# Patient Record
Sex: FEMALE | Race: WHITE | NOT HISPANIC OR LATINO | Employment: FULL TIME | URBAN - METROPOLITAN AREA
[De-identification: names, ages, dates, MRNs, and addresses within clinical notes are randomized per-mention and may not be internally consistent; named-entity substitution may affect disease eponyms.]

---

## 2019-12-06 ENCOUNTER — INITIAL PRENATAL (OUTPATIENT)
Dept: OBGYN CLINIC | Facility: CLINIC | Age: 24
End: 2019-12-06

## 2019-12-06 ENCOUNTER — ROUTINE PRENATAL (OUTPATIENT)
Dept: OBGYN CLINIC | Facility: CLINIC | Age: 24
End: 2019-12-06

## 2019-12-06 VITALS
SYSTOLIC BLOOD PRESSURE: 118 MMHG | BODY MASS INDEX: 29.31 KG/M2 | WEIGHT: 193.4 LBS | HEIGHT: 68 IN | DIASTOLIC BLOOD PRESSURE: 76 MMHG

## 2019-12-06 VITALS — WEIGHT: 193.4 LBS | BODY MASS INDEX: 29.84 KG/M2 | DIASTOLIC BLOOD PRESSURE: 76 MMHG | SYSTOLIC BLOOD PRESSURE: 118 MMHG

## 2019-12-06 DIAGNOSIS — Z34.83 PRENATAL CARE, SUBSEQUENT PREGNANCY, THIRD TRIMESTER: Primary | ICD-10-CM

## 2019-12-06 DIAGNOSIS — Z3A.31 31 WEEKS GESTATION OF PREGNANCY: ICD-10-CM

## 2019-12-06 DIAGNOSIS — Z34.03 ENCOUNTER FOR SUPERVISION OF NORMAL FIRST PREGNANCY IN THIRD TRIMESTER: Primary | ICD-10-CM

## 2019-12-06 PROCEDURE — PNV: Performed by: OBSTETRICS & GYNECOLOGY

## 2019-12-06 PROCEDURE — NOBC: Performed by: OBSTETRICS & GYNECOLOGY

## 2019-12-06 NOTE — PATIENT INSTRUCTIONS
Serous verify from [facility], size equals dates, appropriate workup, will make arrangements for consultation Maternal-Fetal Medicine for history of of marginal insertion of the cord  Return to office in 2 weeks  She has a kick count sheet

## 2019-12-06 NOTE — PROGRESS NOTES
Intrauterine pregnancy, size equals dates, transfer some [place], now 31+ weeks, history of marginal cord insertion will make consultation to Maternal-Fetal Medicine in 3 weeks  Rh-negative she has received RhoGAM twice watch beginning of her pregnancy due for spotting and then at 28 weeks  She has a fetal kick count sheet  The baby is now vertex

## 2019-12-06 NOTE — PROGRESS NOTES
INITIAL PRENATAL NURSE APPT:      Transfer @ 31 5/7 wk from [place], [place] - 31 5/7 wk  Has records but will have labs faxed here  Was followed by OB until 19  Pt is Rh NEG - had Rhogam @ 14 wk (subchor hemorrhage) & 28 wk  28 wk labs done - glucose wnl  Baby was breech until 19  marginal cord insertion - had level 2 U/S &  monthly U/S for growth thereafter - last U/S 19  Taking prenatal vits, DHA supp  Hx celiac disease  Had pap - ASCUS, (+) HPV beg preg - colpo during pregnancy (no biopsies) - recom repeat colpo after del  Pt did not get flu vaccine  Pt did not get TDAP yet during pregnancy - recom  Prev had CF testing - pt &  (-) carrier

## 2019-12-20 ENCOUNTER — ROUTINE PRENATAL (OUTPATIENT)
Dept: OBGYN CLINIC | Facility: CLINIC | Age: 24
End: 2019-12-20

## 2019-12-20 VITALS — WEIGHT: 199.4 LBS | SYSTOLIC BLOOD PRESSURE: 106 MMHG | BODY MASS INDEX: 30.77 KG/M2 | DIASTOLIC BLOOD PRESSURE: 70 MMHG

## 2019-12-20 DIAGNOSIS — Z34.83 PRENATAL CARE, SUBSEQUENT PREGNANCY, THIRD TRIMESTER: Primary | ICD-10-CM

## 2019-12-20 PROCEDURE — PNV: Performed by: OBSTETRICS & GYNECOLOGY

## 2019-12-24 ENCOUNTER — ROUTINE PRENATAL (OUTPATIENT)
Dept: PERINATAL CARE | Facility: CLINIC | Age: 24
End: 2019-12-24
Payer: COMMERCIAL

## 2019-12-24 VITALS
HEART RATE: 77 BPM | DIASTOLIC BLOOD PRESSURE: 78 MMHG | SYSTOLIC BLOOD PRESSURE: 121 MMHG | WEIGHT: 198.4 LBS | BODY MASS INDEX: 30.07 KG/M2 | HEIGHT: 68 IN

## 2019-12-24 DIAGNOSIS — Z3A.34 34 WEEKS GESTATION OF PREGNANCY: ICD-10-CM

## 2019-12-24 DIAGNOSIS — Z36.3 ENCOUNTER FOR ANTENATAL SCREENING FOR MALFORMATIONS: Primary | ICD-10-CM

## 2019-12-24 PROCEDURE — 99201 PR OFFICE OUTPATIENT NEW 10 MINUTES: CPT | Performed by: OBSTETRICS & GYNECOLOGY

## 2019-12-24 PROCEDURE — 76805 OB US >/= 14 WKS SNGL FETUS: CPT | Performed by: OBSTETRICS & GYNECOLOGY

## 2019-12-24 NOTE — LETTER
December 24, 2019     Chris Nance MD  [address]  [address] [place] [address]    Patient: Adria Turner   YOB: 1995   Date of Visit: 12/24/2019       Dear Dr Zohra Farris: Thank you for referring Adria Turner to me for evaluation  Below are my notes for this consultation  If you have questions, please do not hesitate to call me  I look forward to following your patient along with you  Sincerely,        Elan Jarvis MD        CC: No Recipients  Elan Jarvis MD  12/24/2019  1:37 PM  Sign at close encounter  Please refer to the [facility] ultrasound report in Ob Procedures for additional information regarding the visit to the [facility], [facility]  today

## 2019-12-24 NOTE — PROGRESS NOTES
Please refer to the [facility] ultrasound report in Ob Procedures for additional information regarding the visit to the [facility], [facility]  today

## 2019-12-30 ENCOUNTER — TELEPHONE (OUTPATIENT)
Dept: OBGYN CLINIC | Facility: CLINIC | Age: 24
End: 2019-12-30

## 2019-12-30 NOTE — TELEPHONE ENCOUNTER
OB - 35 1/7 wk - c/o (R) sided back pain past 5-6 days, more constant, has taken Tylenol x 2 doses since back pain started  (+) FM  Pt to take ES Tylenol q 6-8 hrs x 24 hrs, intermittent heat with heating pad  Recall office 12/20/19 to update  Next OB appt 1/6/20

## 2019-12-31 ENCOUNTER — HOSPITAL ENCOUNTER (OUTPATIENT)
Facility: HOSPITAL | Age: 24
Discharge: HOME | End: 2019-12-31
Attending: OBSTETRICS & GYNECOLOGY | Admitting: OBSTETRICS & GYNECOLOGY
Payer: COMMERCIAL

## 2019-12-31 ENCOUNTER — HOSPITAL ENCOUNTER (OUTPATIENT)
Facility: HOSPITAL | Age: 24
End: 2019-12-31
Attending: OBSTETRICS & GYNECOLOGY | Admitting: OBSTETRICS & GYNECOLOGY
Payer: COMMERCIAL

## 2019-12-31 ENCOUNTER — APPOINTMENT (OUTPATIENT)
Dept: RADIOLOGY | Facility: HOSPITAL | Age: 24
End: 2019-12-31
Payer: COMMERCIAL

## 2019-12-31 VITALS
TEMPERATURE: 98.6 F | RESPIRATION RATE: 20 BRPM | HEART RATE: 85 BPM | DIASTOLIC BLOOD PRESSURE: 75 MMHG | SYSTOLIC BLOOD PRESSURE: 120 MMHG

## 2019-12-31 DIAGNOSIS — O26.833 PREGNANCY COMPLICATED BY NEPHROLITHIASIS IN THIRD TRIMESTER, ANTEPARTUM: Primary | ICD-10-CM

## 2019-12-31 DIAGNOSIS — N20.0 PREGNANCY COMPLICATED BY NEPHROLITHIASIS IN THIRD TRIMESTER, ANTEPARTUM: Primary | ICD-10-CM

## 2019-12-31 PROBLEM — M54.9 BACK PAIN AFFECTING PREGNANCY IN THIRD TRIMESTER: Status: ACTIVE | Noted: 2019-12-31

## 2019-12-31 PROBLEM — O99.891 BACK PAIN AFFECTING PREGNANCY IN THIRD TRIMESTER: Status: ACTIVE | Noted: 2019-12-31

## 2019-12-31 LAB
BACTERIA UR QL AUTO: ABNORMAL /HPF
BILIRUB UR QL STRIP: NEGATIVE
CLARITY UR: ABNORMAL
COLOR UR: YELLOW
GLUCOSE UR STRIP-MCNC: NEGATIVE MG/DL
HGB UR QL STRIP.AUTO: ABNORMAL
HYALINE CASTS #/AREA URNS LPF: ABNORMAL /LPF
KETONES UR STRIP-MCNC: NEGATIVE MG/DL
LEUKOCYTE ESTERASE UR QL STRIP: ABNORMAL
NITRITE UR QL STRIP: NEGATIVE
NON-SQ EPI CELLS URNS QL MICRO: ABNORMAL /HPF
PH UR STRIP.AUTO: 7 [PH]
PROT UR STRIP-MCNC: ABNORMAL MG/DL
RBC #/AREA URNS AUTO: ABNORMAL /HPF
SP GR UR STRIP.AUTO: 1.01 (ref 1–1.03)
UROBILINOGEN UR QL STRIP.AUTO: 0.2 E.U./DL
WBC #/AREA URNS AUTO: ABNORMAL /HPF

## 2019-12-31 PROCEDURE — NC001 PR NO CHARGE: Performed by: STUDENT IN AN ORGANIZED HEALTH CARE EDUCATION/TRAINING PROGRAM

## 2019-12-31 PROCEDURE — 76770 US EXAM ABDO BACK WALL COMP: CPT

## 2019-12-31 PROCEDURE — 81001 URINALYSIS AUTO W/SCOPE: CPT | Performed by: STUDENT IN AN ORGANIZED HEALTH CARE EDUCATION/TRAINING PROGRAM

## 2019-12-31 PROCEDURE — 99215 OFFICE O/P EST HI 40 MIN: CPT

## 2019-12-31 PROCEDURE — 87086 URINE CULTURE/COLONY COUNT: CPT | Performed by: STUDENT IN AN ORGANIZED HEALTH CARE EDUCATION/TRAINING PROGRAM

## 2019-12-31 RX ORDER — TAMSULOSIN HYDROCHLORIDE 0.4 MG/1
0.4 CAPSULE ORAL
Qty: 14 CAPSULE | Refills: 0 | Status: SHIPPED | OUTPATIENT
Start: 2019-12-31 | End: 2020-01-25 | Stop reason: HOSPADM

## 2019-12-31 RX ORDER — ACETAMINOPHEN 325 MG/1
650 TABLET ORAL EVERY 6 HOURS PRN
Status: DISCONTINUED | OUTPATIENT
Start: 2019-12-31 | End: 2019-12-31 | Stop reason: HOSPADM

## 2019-12-31 RX ADMIN — ACETAMINOPHEN 650 MG: 325 TABLET ORAL at 16:09

## 2019-12-31 NOTE — TELEPHONE ENCOUNTER
Pt still having (R) sided back pain unrelieved wit taking Tylenol consistently  Was having some mild pelvic cramping yesterday, none today  (+) FM today  No urinary sx  Thinks she lost mucous plug 12/30/19  To triage to evaluate/JSW  Pt informed  LR notified

## 2020-01-01 LAB — BACTERIA UR CULT: ABNORMAL

## 2020-01-06 ENCOUNTER — ROUTINE PRENATAL (OUTPATIENT)
Dept: OBGYN CLINIC | Facility: CLINIC | Age: 25
End: 2020-01-06

## 2020-01-06 VITALS — DIASTOLIC BLOOD PRESSURE: 84 MMHG | WEIGHT: 200.2 LBS | SYSTOLIC BLOOD PRESSURE: 130 MMHG | BODY MASS INDEX: 30.89 KG/M2

## 2020-01-06 DIAGNOSIS — Z36.85 ANTENATAL SCREENING FOR STREPTOCOCCUS B: ICD-10-CM

## 2020-01-06 DIAGNOSIS — Z34.03 ENCOUNTER FOR SUPERVISION OF NORMAL FIRST PREGNANCY IN THIRD TRIMESTER: Primary | ICD-10-CM

## 2020-01-06 PROCEDURE — 99910: CPT | Performed by: OBSTETRICS & GYNECOLOGY

## 2020-01-06 PROCEDURE — PNV: Performed by: OBSTETRICS & GYNECOLOGY

## 2020-01-06 PROCEDURE — 87653 STREP B DNA AMP PROBE: CPT | Performed by: OBSTETRICS & GYNECOLOGY

## 2020-01-06 NOTE — PROGRESS NOTES
The patient passed a kidney stone on January 1st she is feels much better  GBS culture done today    Return to office in 1 week verbalization

## 2020-01-06 NOTE — PATIENT INSTRUCTIONS
Positive fetal movement past kidney stone January 1st doing much better GBS culture done today  Return to office in 1 week

## 2020-01-08 LAB — GP B STREP DNA SPEC QL NAA+PROBE: NORMAL

## 2020-01-17 ENCOUNTER — ROUTINE PRENATAL (OUTPATIENT)
Dept: OBGYN CLINIC | Facility: CLINIC | Age: 25
End: 2020-01-17

## 2020-01-17 VITALS — WEIGHT: 203.6 LBS | BODY MASS INDEX: 31.42 KG/M2 | DIASTOLIC BLOOD PRESSURE: 84 MMHG | SYSTOLIC BLOOD PRESSURE: 118 MMHG

## 2020-01-17 DIAGNOSIS — Z34.83 PRENATAL CARE, SUBSEQUENT PREGNANCY, THIRD TRIMESTER: Primary | ICD-10-CM

## 2020-01-17 PROCEDURE — PNV: Performed by: OBSTETRICS & GYNECOLOGY

## 2020-01-17 NOTE — PATIENT INSTRUCTIONS
Positive fetal movement no signs symptoms of labor return to office in 1 week  To continue to fetal kick counts

## 2020-01-22 ENCOUNTER — TELEPHONE (OUTPATIENT)
Dept: OBGYN CLINIC | Facility: CLINIC | Age: 25
End: 2020-01-22

## 2020-01-22 NOTE — TELEPHONE ENCOUNTER
----- Message from Edis Wang MD sent at 1/14/2020 12:34 PM EST -----  Please call this patient CC symptomatic from a positive UA back in December

## 2020-01-23 ENCOUNTER — TELEPHONE (OUTPATIENT)
Dept: OBGYN CLINIC | Facility: CLINIC | Age: 25
End: 2020-01-23

## 2020-01-23 ENCOUNTER — ANESTHESIA EVENT (INPATIENT)
Dept: ANESTHESIOLOGY | Facility: HOSPITAL | Age: 25
End: 2020-01-23
Payer: COMMERCIAL

## 2020-01-23 ENCOUNTER — ANESTHESIA (INPATIENT)
Dept: ANESTHESIOLOGY | Facility: HOSPITAL | Age: 25
End: 2020-01-23
Payer: COMMERCIAL

## 2020-01-23 ENCOUNTER — HOSPITAL ENCOUNTER (INPATIENT)
Facility: HOSPITAL | Age: 25
LOS: 2 days | Discharge: HOME | End: 2020-01-25
Attending: OBSTETRICS & GYNECOLOGY | Admitting: OBSTETRICS & GYNECOLOGY
Payer: COMMERCIAL

## 2020-01-23 DIAGNOSIS — Z3A.38 38 WEEKS GESTATION OF PREGNANCY: ICD-10-CM

## 2020-01-23 LAB
ABO GROUP BLD: NORMAL
BASE EXCESS BLDCOA CALC-SCNC: -3.4 MMOL/L (ref 3–11)
BASE EXCESS BLDCOV CALC-SCNC: -3.5 MMOL/L (ref 1–9)
BLD GP AB SCN SERPL QL: POSITIVE
BLOOD GROUP ANTIBODIES SERPL: NORMAL
ERYTHROCYTE [DISTWIDTH] IN BLOOD BY AUTOMATED COUNT: 11.9 % (ref 11.6–15.1)
HCO3 BLDCOA-SCNC: 24.9 MMOL/L (ref 17.3–27.3)
HCO3 BLDCOV-SCNC: 22.3 MMOL/L (ref 12.2–28.6)
HCT VFR BLD AUTO: 37.8 % (ref 34.8–46.1)
HGB BLD-MCNC: 13.2 G/DL (ref 11.5–15.4)
MCH RBC QN AUTO: 32.7 PG (ref 26.8–34.3)
MCHC RBC AUTO-ENTMCNC: 34.9 G/DL (ref 31.4–37.4)
MCV RBC AUTO: 94 FL (ref 82–98)
O2 CT VFR BLDCOA CALC: 7.2 ML/DL
OXYHGB MFR BLDCOA: 32.3 %
OXYHGB MFR BLDCOV: 62 %
PCO2 BLDCOA: 57.6 MM[HG] (ref 30–60)
PCO2 BLDCOV: 42.9 MM HG (ref 27–43)
PH BLDCOA: 7.25 [PH] (ref 7.23–7.43)
PH BLDCOV: 7.33 [PH] (ref 7.19–7.49)
PLATELET # BLD AUTO: 376 THOUSANDS/UL (ref 149–390)
PMV BLD AUTO: 10.3 FL (ref 8.9–12.7)
PO2 BLDCOA: 17.1 MM HG (ref 5–25)
PO2 BLDCOV: 25.1 MM HG (ref 15–45)
RBC # BLD AUTO: 4.04 MILLION/UL (ref 3.81–5.12)
RH BLD: NEGATIVE
SAO2 % BLDCOV: 14.1 ML/DL
SPECIMEN EXPIRATION DATE: NORMAL
WBC # BLD AUTO: 17.04 THOUSAND/UL (ref 4.31–10.16)

## 2020-01-23 PROCEDURE — 0KQM0ZZ REPAIR PERINEUM MUSCLE, OPEN APPROACH: ICD-10-PCS | Performed by: OBSTETRICS & GYNECOLOGY

## 2020-01-23 PROCEDURE — 86592 SYPHILIS TEST NON-TREP QUAL: CPT | Performed by: STUDENT IN AN ORGANIZED HEALTH CARE EDUCATION/TRAINING PROGRAM

## 2020-01-23 PROCEDURE — 82805 BLOOD GASES W/O2 SATURATION: CPT | Performed by: OBSTETRICS & GYNECOLOGY

## 2020-01-23 PROCEDURE — 10907ZC DRAINAGE OF AMNIOTIC FLUID, THERAPEUTIC FROM PRODUCTS OF CONCEPTION, VIA NATURAL OR ARTIFICIAL OPENING: ICD-10-PCS | Performed by: OBSTETRICS & GYNECOLOGY

## 2020-01-23 PROCEDURE — 86901 BLOOD TYPING SEROLOGIC RH(D): CPT | Performed by: STUDENT IN AN ORGANIZED HEALTH CARE EDUCATION/TRAINING PROGRAM

## 2020-01-23 PROCEDURE — 86870 RBC ANTIBODY IDENTIFICATION: CPT | Performed by: STUDENT IN AN ORGANIZED HEALTH CARE EDUCATION/TRAINING PROGRAM

## 2020-01-23 PROCEDURE — 4A1HXCZ MONITORING OF PRODUCTS OF CONCEPTION, CARDIAC RATE, EXTERNAL APPROACH: ICD-10-PCS | Performed by: OBSTETRICS & GYNECOLOGY

## 2020-01-23 PROCEDURE — 99214 OFFICE O/P EST MOD 30 MIN: CPT

## 2020-01-23 PROCEDURE — 59400 OBSTETRICAL CARE: CPT | Performed by: OBSTETRICS & GYNECOLOGY

## 2020-01-23 PROCEDURE — 99024 POSTOP FOLLOW-UP VISIT: CPT | Performed by: OBSTETRICS & GYNECOLOGY

## 2020-01-23 PROCEDURE — 85027 COMPLETE CBC AUTOMATED: CPT | Performed by: STUDENT IN AN ORGANIZED HEALTH CARE EDUCATION/TRAINING PROGRAM

## 2020-01-23 PROCEDURE — 86850 RBC ANTIBODY SCREEN: CPT | Performed by: STUDENT IN AN ORGANIZED HEALTH CARE EDUCATION/TRAINING PROGRAM

## 2020-01-23 PROCEDURE — 86900 BLOOD TYPING SEROLOGIC ABO: CPT | Performed by: STUDENT IN AN ORGANIZED HEALTH CARE EDUCATION/TRAINING PROGRAM

## 2020-01-23 RX ORDER — OXYTOCIN/RINGER'S LACTATE 30/500 ML
PLASTIC BAG, INJECTION (ML) INTRAVENOUS
Status: DISPENSED
Start: 2020-01-23 | End: 2020-01-24

## 2020-01-23 RX ORDER — LIDOCAINE HYDROCHLORIDE AND EPINEPHRINE 15; 5 MG/ML; UG/ML
INJECTION, SOLUTION EPIDURAL
Status: COMPLETED | OUTPATIENT
Start: 2020-01-23 | End: 2020-01-23

## 2020-01-23 RX ORDER — BUTORPHANOL TARTRATE 1 MG/ML
1 INJECTION, SOLUTION INTRAMUSCULAR; INTRAVENOUS ONCE
Status: COMPLETED | OUTPATIENT
Start: 2020-01-23 | End: 2020-01-23

## 2020-01-23 RX ORDER — SODIUM CHLORIDE, SODIUM LACTATE, POTASSIUM CHLORIDE, CALCIUM CHLORIDE 600; 310; 30; 20 MG/100ML; MG/100ML; MG/100ML; MG/100ML
125 INJECTION, SOLUTION INTRAVENOUS CONTINUOUS
Status: DISCONTINUED | OUTPATIENT
Start: 2020-01-23 | End: 2020-01-23

## 2020-01-23 RX ORDER — ROPIVACAINE HYDROCHLORIDE 2 MG/ML
INJECTION, SOLUTION EPIDURAL; INFILTRATION; PERINEURAL CONTINUOUS PRN
Status: DISCONTINUED | OUTPATIENT
Start: 2020-01-23 | End: 2020-01-23 | Stop reason: SURG

## 2020-01-23 RX ORDER — IBUPROFEN 600 MG/1
600 TABLET ORAL EVERY 6 HOURS PRN
Status: DISCONTINUED | OUTPATIENT
Start: 2020-01-23 | End: 2020-01-25 | Stop reason: HOSPADM

## 2020-01-23 RX ORDER — DIAPER,BRIEF,INFANT-TODD,DISP
1 EACH MISCELLANEOUS AS NEEDED
Status: DISCONTINUED | OUTPATIENT
Start: 2020-01-23 | End: 2020-01-25 | Stop reason: HOSPADM

## 2020-01-23 RX ORDER — ONDANSETRON 2 MG/ML
4 INJECTION INTRAMUSCULAR; INTRAVENOUS EVERY 6 HOURS PRN
Status: DISCONTINUED | OUTPATIENT
Start: 2020-01-23 | End: 2020-01-23

## 2020-01-23 RX ORDER — ROPIVACAINE HYDROCHLORIDE 2 MG/ML
INJECTION, SOLUTION EPIDURAL; INFILTRATION; PERINEURAL AS NEEDED
Status: DISCONTINUED | OUTPATIENT
Start: 2020-01-23 | End: 2020-01-23 | Stop reason: SURG

## 2020-01-23 RX ORDER — SIMETHICONE 80 MG
80 TABLET,CHEWABLE ORAL 4 TIMES DAILY PRN
Status: DISCONTINUED | OUTPATIENT
Start: 2020-01-23 | End: 2020-01-25 | Stop reason: HOSPADM

## 2020-01-23 RX ORDER — CALCIUM CARBONATE 200(500)MG
1000 TABLET,CHEWABLE ORAL DAILY PRN
Status: DISCONTINUED | OUTPATIENT
Start: 2020-01-23 | End: 2020-01-25 | Stop reason: HOSPADM

## 2020-01-23 RX ORDER — DOCUSATE SODIUM 100 MG/1
100 CAPSULE, LIQUID FILLED ORAL 2 TIMES DAILY
Status: DISCONTINUED | OUTPATIENT
Start: 2020-01-24 | End: 2020-01-25 | Stop reason: HOSPADM

## 2020-01-23 RX ORDER — ACETAMINOPHEN 325 MG/1
650 TABLET ORAL EVERY 4 HOURS PRN
Status: DISCONTINUED | OUTPATIENT
Start: 2020-01-23 | End: 2020-01-25 | Stop reason: HOSPADM

## 2020-01-23 RX ADMIN — ROPIVACAINE HYDROCHLORIDE: 2 INJECTION, SOLUTION EPIDURAL; INFILTRATION at 17:48

## 2020-01-23 RX ADMIN — BUTORPHANOL TARTRATE 1 MG: 1 INJECTION INTRAMUSCULAR; INTRAVENOUS at 13:33

## 2020-01-23 RX ADMIN — SODIUM CHLORIDE, SODIUM LACTATE, POTASSIUM CHLORIDE, AND CALCIUM CHLORIDE 125 ML/HR: .6; .31; .03; .02 INJECTION, SOLUTION INTRAVENOUS at 19:01

## 2020-01-23 RX ADMIN — SODIUM CHLORIDE, SODIUM LACTATE, POTASSIUM CHLORIDE, AND CALCIUM CHLORIDE 999 ML/HR: .6; .31; .03; .02 INJECTION, SOLUTION INTRAVENOUS at 16:10

## 2020-01-23 RX ADMIN — ROPIVACAINE HYDROCHLORIDE 5 ML: 2 INJECTION, SOLUTION EPIDURAL; INFILTRATION at 17:32

## 2020-01-23 RX ADMIN — LIDOCAINE HYDROCHLORIDE AND EPINEPHRINE 3 ML: 15; 5 INJECTION, SOLUTION EPIDURAL at 17:25

## 2020-01-23 RX ADMIN — ONDANSETRON 4 MG: 2 INJECTION INTRAMUSCULAR; INTRAVENOUS at 16:50

## 2020-01-23 RX ADMIN — SODIUM CHLORIDE, SODIUM LACTATE, POTASSIUM CHLORIDE, AND CALCIUM CHLORIDE 125 ML/HR: .6; .31; .03; .02 INJECTION, SOLUTION INTRAVENOUS at 17:45

## 2020-01-23 RX ADMIN — Medication 10 ML/HR: at 17:39

## 2020-01-23 NOTE — ANESTHESIA PROCEDURE NOTES
Epidural Block    Patient location during procedure: OB  Start time: 1/23/2020 5:23 PM  Reason for block: procedure for pain and at surgeon's request  Staffing  Anesthesiologist: Joey Ashraf MD  Performed: anesthesiologist   Preanesthetic Checklist  Completed: patient identified, site marked, surgical consent, pre-op evaluation, timeout performed, IV checked, risks and benefits discussed and monitors and equipment checked  Epidural  Patient position: sitting  Prep: ChloraPrep  Patient monitoring: cardiac monitor and frequent blood pressure checks  Approach: midline  Location: lumbar (1-5)  Injection technique: JC air  Needle  Needle type: Tuohy   Needle gauge: 18 G  Catheter type: side hole  Catheter size: 20 G  Catheter at skin depth: 11 cm  Test dose: negativelidocaine 1 5% with epinephrine 1:200,000 test dose, 3 mL  Assessment  Sensory level: T10  Events: paresthesianegative aspiration for CSF, negative aspiration for heme and no paresthesia on injection  patient tolerated the procedure well with no immediate complications  Additional Notes  Patient with paresthesia on catheter placement that immediately resolved   Otherwise unremarkable

## 2020-01-23 NOTE — TELEPHONE ENCOUNTER
Patient call she is kaylynn every 5 min lasting 60 sec for the last hour  Patient sent to L&D triage  JSW and Charge SORAYA hooker

## 2020-01-23 NOTE — ANESTHESIA PREPROCEDURE EVALUATION
Review of Systems/Medical History  Patient summary reviewed  Chart reviewed      Cardiovascular  Negative cardio ROS Exercise tolerance (METS): >4,     Pulmonary  Negative pulmonary ROS        GI/Hepatic  Negative GI/hepatic ROS          Negative  ROS        Endo/Other  Negative endo/other ROS      GYN  Negative gynecology ROS          Hematology  Negative hematology ROS      Musculoskeletal  Negative musculoskeletal ROS        Neurology  Negative neurology ROS      Psychology   Negative psychology ROS              Physical Exam    Airway    Mallampati score: II  TM Distance: >3 FB  Neck ROM: full     Dental   No notable dental hx     Cardiovascular  Comment: Negative ROS, Rhythm: regular, Rate: normal, Cardiovascular exam normal    Pulmonary  Pulmonary exam normal Breath sounds clear to auscultation,     Other Findings        Anesthesia Plan  ASA Score- 2     Anesthesia Type- epidural with ASA Monitors  Additional Monitors:   Airway Plan:     Comment: Native airway maintained with NC  Plan Factors-    Induction-     Postoperative Plan-     Informed Consent- Anesthetic plan and risks discussed with patient  I personally reviewed this patient with the CRNA  Discussed and agreed on the Anesthesia Plan with the CRNA  Evie Quispe

## 2020-01-23 NOTE — PLAN OF CARE
Problem: BIRTH - VAGINAL/ SECTION  Goal: Fetal and maternal status remain reassuring during the birth process  Description  INTERVENTIONS:  - Monitor vital signs  - Monitor fetal heart rate  - Monitor uterine activity  - Monitor labor progression (vaginal delivery)  - DVT prophylaxis  - Antibiotic prophylaxis  Outcome: Progressing  Goal: Emotionally satisfying birthing experience for mother/fetus  Description  Interventions:  - Assess, plan, implement and evaluate the nursing care given to the patient in labor  - Advocate the philosophy that each childbirth experience is a unique experience and support the family's chosen level of involvement and control during the labor process   - Actively participate in both the patient's and family's teaching of the birth process  - Consider cultural, Mandaeism and age-specific factors and plan care for the patient in labor  Outcome: Progressing     Problem: Knowledge Deficit  Goal: Verbalizes understanding of labor plan  Description  Assess patient/family/caregiver's baseline knowledge level and ability to understand information  Provide education via patient/family/caregiver's preferred learning method at appropriate level of understanding  1  Provide teaching at level of understanding  2  Provide teaching via preferred learning method(s)  Outcome: Progressing     Problem: Labor & Delivery  Goal: Manages discomfort  Description  Assess and monitor for signs and symptoms of discomfort  Assess patient's pain level regularly and per hospital policy  Administer medications as ordered  Support use of nonpharmacological methods to help control pain such as distraction, imagery, relaxation, and application of heat and cold  Collaborate with interdisciplinary team and patient to determine appropriate pain management plan  1  Include patient in decisions related to comfort  2  Offer non-pharmacological pain management interventions    3  Report ineffective pain management to physician  Outcome: Progressing  Goal: Patient vital signs are stable  Description  1  Assess vital signs - vaginal delivery    Outcome: Progressing

## 2020-01-23 NOTE — H&P
[address] 21 y o  female MRN: [de-identified]  Unit/Bed#: -01 Encounter: [id number]    Assessment: 21 y o  [employer] [employer] at 38w4d admitted for labor  SVE: 3/80/-2  FHT: 125bpm  Clinical EFW: 7lbs ; Vertex confirmed by U/s  GBS status: Negative       Plan:   · Admit  · CBC, RPR, Type & Screen  · Analgesia at maternal request  · Expectant management to start  · IV fluids  · Clear liquids    Dr Elly Matthew aware      Chief Complaint: contractions    HPI: Inder Puga is a 21 y o  [employer] with an NEFTALI of 2020, by Last Menstrual Period at 38w4d who is being admitted for labor   She complains of uterine contractions, occurring every 3-5 minutes, has no LOF, and reports no VB  She states she has felt good FM  Amy Shea Patient Active Problem List   Diagnosis    Back pain affecting pregnancy in third trimester    Pregnancy complicated by nephrolithiasis in third trimester, antepartum    38 weeks gestation of pregnancy       Baby complications/comments: none    Review of Systems   Constitutional: Negative for chills and fever  Respiratory: Negative for cough, shortness of breath and wheezing  Cardiovascular: Negative for chest pain  Gastrointestinal: Negative for abdominal pain, nausea and vomiting  Genitourinary: Negative for dysuria, hematuria, urgency, vaginal bleeding and vaginal discharge  Neurological: Negative for dizziness, weakness, light-headedness and headaches         OB History    Para Term  AB Living   1 0 0 0 0 0   SAB TAB Ectopic Multiple Live Births   0 0 0 0 0      # Outcome Date GA Lbr Kwame/2nd Weight Sex Delivery Anes PTL Lv   1 Current                Past Medical History:   Diagnosis Date    Abnormal Pap smear of cervix     ASCUS, (+) HPV - HAD COLPO DURING PREG - NO BIOPSIES - F/U AFTER DEL    HPV (human papilloma virus) infection     Migraine     beg of pregnancy related to celiac    Rh incompatibility     had Rhogam @ 14 wk (bleeding) & 28 wk       Past Surgical History:   Procedure Laterality Date    AUGMENTATION BREAST      2016       Social History     Tobacco Use    Smoking status: Never Smoker    Smokeless tobacco: Never Used   Substance Use Topics    Alcohol use: Yes     Alcohol/week: 4 0 standard drinks     Types: 4 Glasses of wine per week     Comment: NONE SINCE PREG       No Known Allergies    Medications Prior to Admission   Medication    Docosahexaenoic Acid (DHA PO)    Prenatal Vit-Fe Fumarate-FA (PRENATAL VITAMIN PO)    tamsulosin (FLOMAX) 0 4 mg       Objective:  Temp:  [97 8 °F (36 6 °C)-98 9 °F (37 2 °C)] 97 8 °F (36 6 °C)  HR:  [67-85] 67  Resp:  [18] 18  BP: (122-135)/(73-84) 135/84  Body mass index is 31 33 kg/m²  Physical Exam:  Physical Exam   Constitutional: She is oriented to person, place, and time  She appears well-developed and well-nourished  Cardiovascular: Normal rate, regular rhythm and normal heart sounds  Exam reveals no gallop and no friction rub  No murmur heard  Pulmonary/Chest: Effort normal  No respiratory distress  She has no wheezes  Abdominal: Soft  There is no tenderness  Musculoskeletal: She exhibits no tenderness  Neurological: She is alert and oriented to person, place, and time  Vitals reviewed         FHT:  Baseline Rate: 125 bpm  Variability: Moderate 6-25 bpm  Accelerations: 15 x 15 or greater  Decelerations: None    TOCO:   Contraction Frequency (minutes): difficult trace  Contraction Duration (seconds): 50-60  Contraction Quality: Mild    Lab Results   Component Value Date    WBC 17 04 (H) 01/23/2020    HGB 13 2 01/23/2020    HCT 37 8 01/23/2020     01/23/2020     No results found for: NA, K, CL, CO2, BUN, CREATININE, GLUCOSE, AST, ALT  Prenatal Labs: Reviewed      Blood type: Negative  Antibody: pending T&Screen  GBS: Negative  HIV:Negative  Rubella: Immune  VDRL/RPR: Non reactive  HBsAg: Negative  Chlamydia: Negative  Gonorrhea: Negative  Diabetes 1 hour screen: normal  Platelets: f/u CBC  Hgb: f/u CBC  >2 Midnights  INPATIENT     Signature/Title:  Kayla Mccann MD  Date: 1/23/2020  Time: 4:49 PM

## 2020-01-24 LAB — RPR SER QL: NORMAL

## 2020-01-24 PROCEDURE — 99024 POSTOP FOLLOW-UP VISIT: CPT | Performed by: OBSTETRICS & GYNECOLOGY

## 2020-01-24 RX ADMIN — IBUPROFEN 600 MG: 600 TABLET ORAL at 23:03

## 2020-01-24 RX ADMIN — IBUPROFEN 600 MG: 600 TABLET ORAL at 17:05

## 2020-01-24 RX ADMIN — ACETAMINOPHEN 650 MG: 325 TABLET, FILM COATED ORAL at 14:35

## 2020-01-24 RX ADMIN — ACETAMINOPHEN 650 MG: 325 TABLET, FILM COATED ORAL at 05:44

## 2020-01-24 RX ADMIN — IBUPROFEN 600 MG: 600 TABLET ORAL at 11:08

## 2020-01-24 RX ADMIN — DOCUSATE SODIUM 100 MG: 100 CAPSULE, LIQUID FILLED ORAL at 08:25

## 2020-01-24 RX ADMIN — IBUPROFEN 600 MG: 600 TABLET ORAL at 04:56

## 2020-01-24 RX ADMIN — Medication 1 TABLET: at 08:25

## 2020-01-24 RX ADMIN — DOCUSATE SODIUM 100 MG: 100 CAPSULE, LIQUID FILLED ORAL at 17:04

## 2020-01-24 RX ADMIN — ACETAMINOPHEN 650 MG: 325 TABLET, FILM COATED ORAL at 21:06

## 2020-01-24 RX ADMIN — HUMAN RHO(D) IMMUNE GLOBULIN 300 MCG: 300 INJECTION, SOLUTION INTRAMUSCULAR at 16:54

## 2020-01-24 RX ADMIN — BENZOCAINE AND LEVOMENTHOL 1 APPLICATION: 200; 5 SPRAY TOPICAL at 01:00

## 2020-01-24 NOTE — OB LABOR/OXYTOCIN SAFETY PROGRESS
Labor Progress Note - Adria Turner 21 y o  female MRN: [de-identified]    Unit/Bed#: -01 Encounter: [id number]       Contraction Frequency (minutes): difficult trace  Contraction Quality: Mild  Tachysystole: No   Dilation: 5        Effacement (%): 90  Station: -1  Baseline Rate: 125 bpm  Fetal Heart Rate: 118 BPM                Notes/comments:   Pt feeling comfortable with epidural    SVE now 5/90/-1  Will AROM at next check       Tiny Romberg, MD 1/23/2020 7:05 PM

## 2020-01-24 NOTE — SOCIAL WORK
Breast pump consult  Discussed freedom of choice and options with pt  Per pt request, Spectra S1 plus tote breast pump package ordered from [facility] via [place] for today  Pt is aware there is an upgrade fee and has means to pay today  No other CM needs noted

## 2020-01-24 NOTE — PLAN OF CARE
Problem: Knowledge Deficit  Goal: Verbalizes understanding of labor plan  Description  Assess patient/family/caregiver's baseline knowledge level and ability to understand information  Provide education via patient/family/caregiver's preferred learning method at appropriate level of understanding  1  Provide teaching at level of understanding  2  Provide teaching via preferred learning method(s)  1/24/2020 1058 by Jose A Cade RN  Outcome: Progressing  1/24/2020 1057 by Jose A Cade RN  Outcome: Progressing     Problem: PAIN - ADULT  Goal: Verbalizes/displays adequate comfort level or baseline comfort level  Description  Interventions:  - Encourage patient to monitor pain and request assistance  - Assess pain using appropriate pain scale  - Administer analgesics based on type and severity of pain and evaluate response  - Implement non-pharmacological measures as appropriate and evaluate response  - Consider cultural and social influences on pain and pain management  - Notify physician/advanced practitioner if interventions unsuccessful or patient reports new pain  1/24/2020 1058 by Jose A Cade RN  Outcome: Progressing  1/24/2020 1057 by Jose A Cade RN  Outcome: Progressing     Problem: SAFETY ADULT  Goal: Patient will remain free of falls  Description  INTERVENTIONS:  - Assess patient frequently for physical needs  -  Identify cognitive and physical deficits and behaviors that affect risk of falls    -  [place] fall precautions as indicated by assessment   - Educate patient/family on patient safety including physical limitations  - Instruct patient to call for assistance with activity based on assessment  - Modify environment to reduce risk of injury  - Consider OT/PT consult to assist with strengthening/mobility  1/24/2020 1058 by Jose A Cade RN  Outcome: Progressing  1/24/2020 1057 by Jose A Cade RN  Outcome: Progressing  Goal: Maintain or return to baseline ADL function  Description  INTERVENTIONS:  -  Assess patient's ability to carry out ADLs; assess patient's baseline for ADL function and identify physical deficits which impact ability to perform ADLs (bathing, care of mouth/teeth, toileting, grooming, dressing, etc )  - Assess/evaluate cause of self-care deficits   - Assess range of motion  - Assess patient's mobility; develop plan if impaired  - Assess patient's need for assistive devices and provide as appropriate  - Encourage maximum independence but intervene and supervise when necessary  - Involve family in performance of ADLs  - Assess for home care needs following discharge   - Consider OT consult to assist with ADL evaluation and planning for discharge  - Provide patient education as appropriate  1/24/2020 1058 by Janet Valenzuela RN  Outcome: Progressing  1/24/2020 1057 by Janet Valenzuela RN  Outcome: Progressing  Goal: Maintain or return mobility status to optimal level  Description  INTERVENTIONS:  - Assess patient's baseline mobility status (ambulation, transfers, stairs, etc )    - Identify cognitive and physical deficits and behaviors that affect mobility  - Identify mobility aids required to assist with transfers and/or ambulation (gait belt, sit-to-stand, lift, walker, cane, etc )  - [place] fall precautions as indicated by assessment  - Record patient progress and toleration of activity level on Mobility SBAR; progress patient to next Phase/Stage  - Instruct patient to call for assistance with activity based on assessment  - Consider rehabilitation consult to assist with strengthening/weightbearing, etc   1/24/2020 1058 by Janet Valenzuela RN  Outcome: Progressing  1/24/2020 1057 by Janet Valenzuela RN  Outcome: Progressing     Problem: DISCHARGE PLANNING  Goal: Discharge to home or other facility with appropriate resources  Description  INTERVENTIONS:  - Identify barriers to discharge w/patient and caregiver  - Arrange for needed discharge resources and transportation as appropriate  - Identify discharge learning needs (meds, wound care, etc )  - Arrange for interpretive services to assist at discharge as needed  - Refer to Case Management Department for coordinating discharge planning if the patient needs post-hospital services based on physician/advanced practitioner order or complex needs related to functional status, cognitive ability, or social support system  2020 1058 by Austin Ramirez RN  Outcome: Progressing  2020 1057 by Austin Ramirez RN  Outcome: Progressing     Problem: POSTPARTUM  Goal: Experiences normal postpartum course  Description  INTERVENTIONS:  - Monitor maternal vital signs  - Assess uterine involution and lochia  2020 1058 by Austin Ramirez RN  Outcome: Progressing  2020 105 by Austin Ramirez RN  Outcome: Progressing  Goal: Appropriate maternal -  bonding  Description  INTERVENTIONS:  - Identify family support  - Assess for appropriate maternal/infant bonding   -Encourage maternal/infant bonding opportunities  - Referral to  or  as needed  2020 1058 by Austin Ramirez RN  Outcome: Progressing  2020 105 by Austin Ramirez RN  Outcome: Progressing  Goal: Establishment of infant feeding pattern  Description  INTERVENTIONS:  - Assess breast/bottle feeding  - Refer to lactation as needed  2020 1058 by Austin Ramirez RN  Outcome: Progressing  2020 105 by Austin Ramirez RN  Outcome: Progressing     Problem: ALTERATION IN THE BREAST  Goal: Optimize infant feeding at the breast  Description  INTERVENTIONS:  - Latch, breast and nipple assessment  - Assess prior breast feeding history  - Hand expression of breast milk  - For cracked, bleeding and or sore nipples reassess latch, treat damaged nipple  -Educate mother on feeding cues  -Positioning/latch techniques  2020 1058 by Austin Ramirez RN  Outcome: Progressing  2020 1057 by Christine Caruso RN  Outcome: Progressing

## 2020-01-24 NOTE — OB LABOR/OXYTOCIN SAFETY PROGRESS
Labor Progress Note - Odin Carbajal 21 y o  female MRN: [de-identified]    Unit/Bed#: -01 Encounter: [id number]       Contraction Frequency (minutes): 2 5-3 5  Contraction Quality: Moderate  Tachysystole: No   Dilation: 8        Effacement (%): 90  Station: 0  Baseline Rate: 120 bpm  Fetal Heart Rate: 118 BPM  FHR Category: Category I             Notes/comments:   Pt feeling a lot more rectal pressure  SVE 8/90/0  Will recheck in 1 hour or sooner if feeling more pressure    D/w Dr Shikha Díaz MD 1/23/2020 8:24 PM

## 2020-01-24 NOTE — OB LABOR/OXYTOCIN SAFETY PROGRESS
Labor Progress Note - Tiara Hanks 21 y o  female MRN: [de-identified]    Unit/Bed#: -01 Encounter: [id number]       Contraction Frequency (minutes): 2-3 5  Contraction Quality: Moderate  Tachysystole: No   Dilation: 9        Effacement (%): 90  Station: 1  Baseline Rate: 120 bpm  Fetal Heart Rate: 118 BPM  FHR Category: Category I             Notes/comments:   Pt feeling more rectal pressure  SVE now 9/90/+1  Will recheck within the hour  D/w Dr Davis Madrid MD 1/23/2020 8:55 PM

## 2020-01-24 NOTE — LACTATION NOTE
This note was copied from a baby's chart  CONSULT - LACTATION  Baby Boy Sylviegerry Grewal) Lola 1 days male MRN: [de-identified]    [address] AN ULTRASOUND Room / Bed: (N)/(N) Encounter: [id number]    Maternal Information     MOTHER:  [de-identified]  Maternal Age: 21 y o    OB History: #: 1, Date: 20, Sex: Male, Weight: 3515 g (7 lb 12 oz), GA: 38w4d, Delivery: Vaginal, Spontaneous, Apgar1: 9, Apgar5: 9, Living: Living, Birth Comments: None   Previous breast augmentation surgery? Yes    Lactation history:   Has patient previously breast fed: No   How long had patient previously breast fed:     Previous breast feeding complications:       Past Surgical History:   Procedure Laterality Date    AUGMENTATION BREAST      2016       Birth information:  YOB: 2021   Time of birth: 10:07 PM   Sex: male   Delivery type: Vaginal, Spontaneous   Birth Weight: 3515 g (7 lb 12 oz)   Percent of Weight Change: 0%     Gestational Age: 38w3d   [unfilled]    Assessment     Breast and nipple assessment: hx breast augmentation     Assessment: sleepy and will suck on finger, would not stay latched to breast     Feeding assessment: sucks well on assessment of oral structures  LATCH:  Latch: Repeated attempts, hold nipple in mouth, stimulate to suck   Audible Swallowing: A few with stimulation   Type of Nipple: Everted (After stimulation)   Comfort (Breast/Nipple): Soft/non-tender   Hold (Positioning): Full assist, staff holds infant at breast   LATCH Score: 6          Feeding recommendations:  breast feed on demand    Encouraged Torsten Rivera to begin pumping if infant continues to have trouble latching  Hand expression + for large amounts of colostrum  Hand expressed most of feeding into infant's mouth at the breast     Discussed 2nd night syndrome and ways to calm infant  Hand out given  Information on hand expression given   Discussed benefits of knowing how to manually express breast including stimulating milk supply, softening nipple for latch and evacuating breast in the event of engorgement  Met with mother  Provided mother with Ready, Set, Baby booklet  Discussed Skin to Skin contact an benefits to mom and baby  Talked about the delay of the first bath until baby has adjusted  Spoke about the benefits of rooming in  Feeding on cue and what that means for recognizing infant's hunger  Avoidance of pacifiers for the first month discussed  Talked about exclusive breastfeeding for the first 6 months  Positioning and latch reviewed as well as showing images of other feeding positions  Discussed the properties of a good latch in any position  Reviewed hand/manual expression  Discussed s/s that baby is getting enough milk and some s/s that breastfeeding dyad may need further help  Gave information on common concerns, what to expect the first few weeks after delivery, preparing for other caregivers, and how partners can help  Resources for support also provided  Worked on positioning infant up at chest level and starting to feed infant with nose arriving at the nipple  Then, using areolar compression to achieve a deep latch that is comfortable and exchanges optimum amounts of milk  Encouraged parents to call for assistance, questions, and concerns about breastfeeding  Extension provided    Gordon Quinn RN 1/24/2020 2:17 PM

## 2020-01-24 NOTE — DISCHARGE INSTRUCTIONS
Breast Care for the Breast Feeding Mother   WHAT YOU SHOULD KNOW:   Your breasts will go through normal changes while you are breastfeeding  Sometimes breast and nipple problems can develop while you are breastfeeding  Learn about changes that are normal and those that may be a problem  Breast care can help you prevent and manage problems so you and your baby can enjoy the benefits of breastfeeding  AFTER YOU LEAVE:   Breast changes while you are breastfeeding:   · For the first few days after your baby is born, your body makes a small amount of breast milk (colostrum)  Within about 2 to 5 days, your body will begin making mature milk  It may take up to 10 days or longer for mature milk to come in  When your mature milk comes in, your breasts will become full and firm  They may feel tender  · Breastfeeding your baby will decrease the full feeling in your breasts  You may feel a tingly sensation during feedings as milk is released from your breasts  This is called the milk let-down reflex  After 7 or more days, the fullness may feel like it has decreased  Your nipples should look the same as they did before you started breastfeeding  Breasts that feel full before and empty after breastfeeding are signs that breastfeeding is going well  Breast problems that can occur while you are breastfeeding:   · Nipple soreness  may occur when you begin to breastfeed your baby  You may also have nipple soreness if your baby is not latched on to your breast correctly  Correct positioning and latch-on may decrease or stop the pain in your nipples  Work with your caregivers to help your baby latch on correctly  It may also be helpful to place warm, wet compresses on your nipples to help decrease pain  · Plugged milk ducts  may cause painful breast lumps  Plugged ducts may be caused by not emptying your breasts completely during feedings   When your baby pauses during breastfeeding, massage and gently squeeze your breast  Gentle massage may unplug a blocked milk duct  Pump out any milk left in your breasts after your baby is done breastfeeding  Avoid wearing tight tops, tight bras, or under-wire bras, because they may put pressure on your breasts  · Engorgement  may occur as your milk comes in soon after you begin breastfeeding  Engorgement may cause your breasts to become swollen and painful  Your breasts may also become engorged if you miss a feeding or you do not breastfeed on demand  The best way to decrease engorgement symptoms is to empty your breasts by feeding your baby often  Engorgement can make it hard for your baby to latch on to your breast  If this happens, express a small amount of milk and then have your baby latch on  Cold compresses, gel packs, or ice packs on your breasts can help decrease pain and swelling  Ask your caregiver how often and how long you should use cold, or ice packs  · A breast infection called mastitis  can develop if you have plugged milk ducts or engorgement  Mastitis causes your breasts to become red, swollen, and painful  You may also have flu-like symptoms, such as chills and a fever  Place heat on your breasts to help decrease the pain  You may want to place a moist, warm cloth on the painful breast or both of your breasts  Ask how often to do this  Your primary healthcare provider [facility]) may suggest that you take an NSAID, such as ibuprofen, to decrease pain and swelling  He may also order antibiotics to treat mastitis  Ask about feeding your baby when you have a breast infection  How to help prevent or manage breast problems while you are breastfeeding:   · Learn how to position your baby and latch him on correctly  To latch your baby correctly to your breast, make sure that his mouth covers most of your areola (dark area around your nipple)  He should not be attached only to the nipple  Your baby is latched on well if you feel comfortable and do not feel pain   A correct latch helps him get enough milk and can help to prevent sore nipples and other breast problems  There are several breastfeeding positions that you can try  Find the position that works best for you and your baby  Ask your caregiver for more information about how to hold and breastfeed your baby  · Prevent biting  Your baby may get teeth at about 1to 3months of age  To help prevent biting, break his suction once he is finished or if he has fallen asleep  To break his suction, slip a finger into the side of his mouth  If your baby bites you, respond with surprise or unhappiness  Offer praise when he does not bite you  · Breastfeed your baby regularly  Feed your baby 8 to 12 times a day  You may need to wake up your baby at night to feed him  It is okay to feed from 1 or both breasts at each feeding  Your baby should breastfeed from both breasts equally over the course of a day  If your baby only feeds from 1 side during a feeding, offer your other breast to him first for the next feeding  · Schedule and keep follow-up visits  Talk to your baby's pediatrician or your PHP during follow-up visits if you have breast problems  Caregivers may suggest that you, or you and your partner, attend classes on breastfeeding  You also may want to join a breastfeeding support group  Caregivers may suggest that you see a lactation consultant  This is a caregiver who can help you with breastfeeding  Contact your PHP if:   · You have a fever and chills  · You have body aches and you feel like you do not have any energy  · One or both of your breasts is red, swollen or hard, painful, and feels warm or hot  · You have breast engorgement that does not get better within 24 hours  · You see or feel a lump in your breast that hurts when you touch it  · You have nipple pain during breastfeeding or between feedings  · Your nipples are red, dry, cracked, or bleeding, or they have scabs on them       · You have questions or concerns about your condition or care  2014 [address] is for End User's use only and may not be sold, redistributed or otherwise used for commercial purposes  All illustrations and images included in CareNotes® are the copyrighted property of A D JESSICA HYDE , [employer]  or Cesar Arndt  The above information is an  only  It is not intended as medical advice for individual conditions or treatments  Talk to your doctor, nurse or pharmacist before following any medical regimen to see if it is safe and effective for you  Postpartum Bleeding   WHAT YOU NEED TO KNOW:   Postpartum bleeding is vaginal bleeding after childbirth  This bleeding is normal, whether your baby was born vaginally or by   It contains blood and the tissue that lined the inside of your uterus when you were pregnant  DISCHARGE INSTRUCTIONS:   What to expect with postpartum bleeding:  Postpartum bleeding usually lasts at least 10 days, and may last longer than 6 weeks  Your bleeding may range from light (barely staining a pad) to heavy (soaking a pad in 1 hour)  Usually, you have heavier bleeding right after childbirth, which slows over the next few weeks until it stops  The bleeding is red or dark brown with clots for the first 1 to 3 days  It then turns pink for several days, and then becomes a white or yellow discharge until it ends  Follow up with your obstetrician as directed:  Do not have sex until your obstetrician says it is okay  Write down your questions so you remember to ask them during your visits  Contact your healthcare provider or obstetrician if:   · Your bleeding increases, or you have heavy bleeding that soaks a pad in 1 hour for 2 hours in a row  · You pass large blood clots  · You are breathing faster than normal, or your heart is beating faster than normal     · You are urinating less than usual, or not at all  · You feel dizzy      · You have questions or concerns about your condition or care  Seek immediate care or call 911 if:   · You are suddenly short of breath and feel lightheaded  · You have sudden chest pain  © 2017 [address] is for End User's use only and may not be sold, redistributed or otherwise used for commercial purposes  All illustrations and images included in CareNotes® are the copyrighted property of A D A M [employer]  or Cesar Arndt  The above information is an  only  It is not intended as medical advice for individual conditions or treatments  Talk to your doctor, nurse or pharmacist before following any medical regimen to see if it is safe and effective for you  Postpartum Perineal Care   WHAT YOU NEED TO KNOW:   Postpartum perineal care is care for your perineum after you have a baby  The perineum is your vagina and anus  DISCHARGE INSTRUCTIONS:   Care for your perineum:  Healthcare providers will give you a small squirt bottle and show you how to use it  Do the following after you use the toilet and before you put on a new pad:  · Remove the soiled pad    · Use the squirt bottle to rinse your perineum from front to back while you sit on the toilet     · Pat the area dry from front to back with toilet paper or a cotton cloth     · Put on a fresh pad    · Wash your hands  Decrease pain:  Ask your healthcare provider about these and other ways to decrease perineal pain:  · Sitz baths:  Healthcare providers may give you a portable sitz bath  This is a small tub that fits in the toilet  Fill the sitz bath or bathtub with 4 to 6 inches of warm water  Sit in the warm water for 20 minutes 2 to 3 times a day  · Ice:  Ice helps decrease swelling and pain  Ice may also help prevent tissue damage  Use an ice pack, or put crushed ice in a plastic bag  Cover it with a towel and place it on your perineum for 15 to 20 minutes every hour, or as directed      · Medicine spray, wipes, or pads:  Healthcare providers may give you a medicine spray or wipes soaked with numbing medicine to decrease the pain  Pads that contain an herb called witch hazel may also help reduce pain  Use these after perineal care or a sitz bath  Follow up with your healthcare provider as directed:  Write down your questions so you remember to ask them during your visits  Contact your healthcare provider if:   · You have heavy vaginal bleeding that fills 1 or more sanitary pads in 1 hour  · You have foul-smelling vaginal discharge  · You feel weak or lightheaded  · You have questions or concerns about your condition or care  Seek care immediately or call 911 if:   · You have large blood clots or bright red blood coming from your vagina  · You have abdominal pain, vomiting, and a fever  © 2017 [address] Information is for End User's use only and may not be sold, redistributed or otherwise used for commercial purposes  All illustrations and images included in CareNotes® are the copyrighted property of A D A M , Inc  or Cesar Arndt  The above information is an  only  It is not intended as medical advice for individual conditions or treatments  Talk to your doctor, nurse or pharmacist before following any medical regimen to see if it is safe and effective for you

## 2020-01-24 NOTE — PLAN OF CARE
Problem: BIRTH - VAGINAL/ SECTION  Goal: Fetal and maternal status remain reassuring during the birth process  Description  INTERVENTIONS:  - Monitor vital signs  - Monitor fetal heart rate  - Monitor uterine activity  - Monitor labor progression (vaginal delivery)  - DVT prophylaxis  - Antibiotic prophylaxis  2020 by You Portillo RN  Outcome: Completed  2020 by You Portillo RN  Outcome: Progressing  Goal: Emotionally satisfying birthing experience for mother/fetus  Description  Interventions:  - Assess, plan, implement and evaluate the nursing care given to the patient in labor  - Advocate the philosophy that each childbirth experience is a unique experience and support the family's chosen level of involvement and control during the labor process   - Actively participate in both the patient's and family's teaching of the birth process  - Consider cultural, Jain and age-specific factors and plan care for the patient in labor  2020 by You Portillo RN  Outcome: Completed  2020 by You Portillo RN  Outcome: Progressing     Problem: POSTPARTUM  Goal: Experiences normal postpartum course  Description  INTERVENTIONS:  - Monitor maternal vital signs  - Assess uterine involution and lochia  Outcome: Progressing  Goal: Appropriate maternal -  bonding  Description  INTERVENTIONS:  - Identify family support  - Assess for appropriate maternal/infant bonding   -Encourage maternal/infant bonding opportunities  - Referral to  or  as needed  Outcome: Progressing  Goal: Establishment of infant feeding pattern  Description  INTERVENTIONS:  - Assess breast/bottle feeding  - Refer to lactation as needed  Outcome: Progressing     Problem: Knowledge Deficit  Goal: Verbalizes understanding of labor plan  Description  Assess patient/family/caregiver's baseline knowledge level and ability to understand information    Provide education via patient/family/caregiver's preferred learning method at appropriate level of understanding  1  Provide teaching at level of understanding  2  Provide teaching via preferred learning method(s)  1/24/2020 0227 by Urszula Anderson RN  Outcome: Progressing  1/23/2020 2028 by Urszula Anderson RN  Outcome: Progressing     Problem: Labor & Delivery  Goal: Manages discomfort  Description  Assess and monitor for signs and symptoms of discomfort  Assess patient's pain level regularly and per hospital policy  Administer medications as ordered  Support use of nonpharmacological methods to help control pain such as distraction, imagery, relaxation, and application of heat and cold  Collaborate with interdisciplinary team and patient to determine appropriate pain management plan  1  Include patient in decisions related to comfort  2  Offer non-pharmacological pain management interventions  3  Report ineffective pain management to physician   1/24/2020 0227 by Urszula Anderson RN  Outcome: Completed  1/23/2020 2028 by Urszula Anderson RN  Outcome: Progressing  Goal: Patient vital signs are stable  Description  1  Assess vital signs - vaginal delivery    1/24/2020 0227 by Urszula Anderson RN  Outcome: Completed  1/23/2020 2028 by Urszula Anderson RN  Outcome: Progressing     Problem: PAIN - ADULT  Goal: Verbalizes/displays adequate comfort level or baseline comfort level  Description  Interventions:  - Encourage patient to monitor pain and request assistance  - Assess pain using appropriate pain scale  - Administer analgesics based on type and severity of pain and evaluate response  - Implement non-pharmacological measures as appropriate and evaluate response  - Consider cultural and social influences on pain and pain management  - Notify physician/advanced practitioner if interventions unsuccessful or patient reports new pain  1/24/2020 0227 by Urszula Anderson RN  Outcome: Progressing  1/23/2020 2028 by Urszula Anderson RN  Outcome: Progressing     Problem: SAFETY ADULT  Goal: Patient will remain free of falls  Description  INTERVENTIONS:  - Assess patient frequently for physical needs  -  Identify cognitive and physical deficits and behaviors that affect risk of falls    -  [place] fall precautions as indicated by assessment   - Educate patient/family on patient safety including physical limitations  - Instruct patient to call for assistance with activity based on assessment  - Modify environment to reduce risk of injury  - Consider OT/PT consult to assist with strengthening/mobility  1/24/2020 0227 by Josh Yun RN  Outcome: Progressing  1/23/2020 2028 by Josh Yun RN  Outcome: Progressing  Goal: Maintain or return to baseline ADL function  Description  INTERVENTIONS:  -  Assess patient's ability to carry out ADLs; assess patient's baseline for ADL function and identify physical deficits which impact ability to perform ADLs (bathing, care of mouth/teeth, toileting, grooming, dressing, etc )  - Assess/evaluate cause of self-care deficits   - Assess range of motion  - Assess patient's mobility; develop plan if impaired  - Assess patient's need for assistive devices and provide as appropriate  - Encourage maximum independence but intervene and supervise when necessary  - Involve family in performance of ADLs  - Assess for home care needs following discharge   - Consider OT consult to assist with ADL evaluation and planning for discharge  - Provide patient education as appropriate  1/24/2020 0227 by Josh Yun RN  Outcome: Progressing  1/23/2020 2028 by Josh Yun RN  Outcome: Progressing  Goal: Maintain or return mobility status to optimal level  Description  INTERVENTIONS:  - Assess patient's baseline mobility status (ambulation, transfers, stairs, etc )    - Identify cognitive and physical deficits and behaviors that affect mobility  - Identify mobility aids required to assist with transfers and/or ambulation (gait belt, sit-to-stand, lift, walker, cane, etc )  - [place] fall precautions as indicated by assessment  - Record patient progress and toleration of activity level on Mobility SBAR; progress patient to next Phase/Stage  - Instruct patient to call for assistance with activity based on assessment  - Consider rehabilitation consult to assist with strengthening/weightbearing, etc   1/24/2020 0227 by Rosario Patino RN  Outcome: Progressing  1/23/2020 2028 by Rosario Patino RN  Outcome: Progressing     Problem: DISCHARGE PLANNING  Goal: Discharge to home or other facility with appropriate resources  Description  INTERVENTIONS:  - Identify barriers to discharge w/patient and caregiver  - Arrange for needed discharge resources and transportation as appropriate  - Identify discharge learning needs (meds, wound care, etc )  - Arrange for interpretive services to assist at discharge as needed  - Refer to Case Management Department for coordinating discharge planning if the patient needs post-hospital services based on physician/advanced practitioner order or complex needs related to functional status, cognitive ability, or social support system  1/24/2020 0227 by Rosario Patino RN  Outcome: Progressing  1/23/2020 2028 by Rosario Patino RN  Outcome: Progressing     Problem: ALTERATION IN THE BREAST  Goal: Optimize infant feeding at the breast  Description  INTERVENTIONS:  - Latch, breast and nipple assessment  - Assess prior breast feeding history  - Hand expression of breast milk  - For cracked, bleeding and or sore nipples reassess latch, treat damaged nipple  -Educate mother on feeding cues  -Positioning/latch techniques  Outcome: Progressing

## 2020-01-24 NOTE — PLAN OF CARE
Problem: BIRTH - VAGINAL/ SECTION  Goal: Fetal and maternal status remain reassuring during the birth process  Description  INTERVENTIONS:  - Monitor vital signs  - Monitor fetal heart rate  - Monitor uterine activity  - Monitor labor progression (vaginal delivery)  - DVT prophylaxis  - Antibiotic prophylaxis  Outcome: Progressing  Goal: Emotionally satisfying birthing experience for mother/fetus  Description  Interventions:  - Assess, plan, implement and evaluate the nursing care given to the patient in labor  - Advocate the philosophy that each childbirth experience is a unique experience and support the family's chosen level of involvement and control during the labor process   - Actively participate in both the patient's and family's teaching of the birth process  - Consider cultural, Latter day and age-specific factors and plan care for the patient in labor  Outcome: Progressing     Problem: Knowledge Deficit  Goal: Verbalizes understanding of labor plan  Description  Assess patient/family/caregiver's baseline knowledge level and ability to understand information  Provide education via patient/family/caregiver's preferred learning method at appropriate level of understanding  1  Provide teaching at level of understanding  2  Provide teaching via preferred learning method(s)  Outcome: Progressing     Problem: Labor & Delivery  Goal: Manages discomfort  Description  Assess and monitor for signs and symptoms of discomfort  Assess patient's pain level regularly and per hospital policy  Administer medications as ordered  Support use of nonpharmacological methods to help control pain such as distraction, imagery, relaxation, and application of heat and cold  Collaborate with interdisciplinary team and patient to determine appropriate pain management plan  1  Include patient in decisions related to comfort  2  Offer non-pharmacological pain management interventions    3  Report ineffective pain management to physician  Outcome: Progressing  Goal: Patient vital signs are stable  Description  1  Assess vital signs - vaginal delivery  Outcome: Progressing     Problem: PAIN - ADULT  Goal: Verbalizes/displays adequate comfort level or baseline comfort level  Description  Interventions:  - Encourage patient to monitor pain and request assistance  - Assess pain using appropriate pain scale  - Administer analgesics based on type and severity of pain and evaluate response  - Implement non-pharmacological measures as appropriate and evaluate response  - Consider cultural and social influences on pain and pain management  - Notify physician/advanced practitioner if interventions unsuccessful or patient reports new pain  Outcome: Progressing     Problem: SAFETY ADULT  Goal: Patient will remain free of falls  Description  INTERVENTIONS:  - Assess patient frequently for physical needs  -  Identify cognitive and physical deficits and behaviors that affect risk of falls    -  [place] fall precautions as indicated by assessment   - Educate patient/family on patient safety including physical limitations  - Instruct patient to call for assistance with activity based on assessment  - Modify environment to reduce risk of injury  - Consider OT/PT consult to assist with strengthening/mobility  Outcome: Progressing  Goal: Maintain or return to baseline ADL function  Description  INTERVENTIONS:  -  Assess patient's ability to carry out ADLs; assess patient's baseline for ADL function and identify physical deficits which impact ability to perform ADLs (bathing, care of mouth/teeth, toileting, grooming, dressing, etc )  - Assess/evaluate cause of self-care deficits   - Assess range of motion  - Assess patient's mobility; develop plan if impaired  - Assess patient's need for assistive devices and provide as appropriate  - Encourage maximum independence but intervene and supervise when necessary  - Involve family in performance of ADLs  - Assess for home care needs following discharge   - Consider OT consult to assist with ADL evaluation and planning for discharge  - Provide patient education as appropriate  Outcome: Progressing  Goal: Maintain or return mobility status to optimal level  Description  INTERVENTIONS:  - Assess patient's baseline mobility status (ambulation, transfers, stairs, etc )    - Identify cognitive and physical deficits and behaviors that affect mobility  - Identify mobility aids required to assist with transfers and/or ambulation (gait belt, sit-to-stand, lift, walker, cane, etc )  - [place] fall precautions as indicated by assessment  - Record patient progress and toleration of activity level on Mobility SBAR; progress patient to next Phase/Stage  - Instruct patient to call for assistance with activity based on assessment  - Consider rehabilitation consult to assist with strengthening/weightbearing, etc   Outcome: Progressing     Problem: DISCHARGE PLANNING  Goal: Discharge to home or other facility with appropriate resources  Description  INTERVENTIONS:  - Identify barriers to discharge w/patient and caregiver  - Arrange for needed discharge resources and transportation as appropriate  - Identify discharge learning needs (meds, wound care, etc )  - Arrange for interpretive services to assist at discharge as needed  - Refer to Case Management Department for coordinating discharge planning if the patient needs post-hospital services based on physician/advanced practitioner order or complex needs related to functional status, cognitive ability, or social support system  Outcome: Progressing

## 2020-01-24 NOTE — OB LABOR/OXYTOCIN SAFETY PROGRESS
Labor Progress Note - Genaro Haynes 21 y o  female MRN: [de-identified]    Unit/Bed#: -01 Encounter: [id number]       Contraction Frequency (minutes): 1-3  Contraction Quality: Mild  Tachysystole: No   Dilation: 6        Effacement (%): 90  Station: 0  Baseline Rate: 115 bpm  Fetal Heart Rate: 118 BPM  FHR Category: Category I             Notes/comments:   Pt comfortable with epidural    AROM for blood-tinged fluid at 0752  SVE now 6/90/0  Will rehceck in 2 hours, sooner if feeling more pressure  D/w Dr Samra Rebolledo MD 1/23/2020 7:58 PM

## 2020-01-24 NOTE — PROGRESS NOTES
Progress Note - OB/GYN   Rizwan Weeks 21 y o  female MRN: [de-identified]  Unit/Bed#: -01 Encounter: [id number]    Assessment:  21 y o  [place] s/p Spontaneous Vaginal Delivery Postpartum day  1    Plan:  1  Routine post-partum care  -Encourage ambulation  -Pain control as needed  -Advance diet as tolerated  -Rhogam not indicated    2  Anticipate discharge tomorrow    Subjective/Objective   Chief Complaint:       Subjective:  Rizwan Weeks is well appearing and had no acute events overnight  Pain: Well controlled with pain medication regimen  Tolerating PO: yes  Voiding: yes  Flatus: yes  BM: no  Ambulating: yes  Breastfeeding: yes  Chest pain: no  Shortness of breath: no  Leg pain: no  Lochia: Decreasing    Objective:     Vitals: Blood pressure 134/60, pulse 92, temperature 98 2 °F (36 8 °C), temperature source Temporal, resp  rate 18, height 5' 7 5" (1 715 m), weight 92 1 kg (203 lb), last menstrual period 04/28/2019, SpO2 97 %, currently breastfeeding      Intake/Output Summary (Last 24 hours) at 1/24/2020 0546  Last data filed at 1/24/2020 0220  Gross per 24 hour   Intake --   Output 1933 ml   Net -1933 ml       Physical Exam:     General: NAD  Cardiovascular: RRR, no murmur, nl S1/S2   Lungs: CTAB, non-labored breathing   Abdomen: Soft, no distension/rebound/guarding/tenderness   Fundus: Firm, non-tender, fundus: -1 cm below the umbilicus   Lower Extremities: Non-tender    Lab, Imaging and other studies:     Recent Results (from the past 72 hour(s))   CBC    Collection Time: 01/23/20  4:07 PM   Result Value Ref Range    WBC 17 04 (H) 4 31 - 10 16 Thousand/uL    RBC 4 04 3 81 - 5 12 Million/uL    Hemoglobin 13 2 11 5 - 15 4 g/dL    Hematocrit 37 8 34 8 - 46 1 %    MCV 94 82 - 98 fL    MCH 32 7 26 8 - 34 3 pg    MCHC 34 9 31 4 - 37 4 g/dL    RDW 11 9 11 6 - 15 1 %    Platelets 881 543 - 222 Thousands/uL    MPV 10 3 8 9 - 12 7 fL   Type and screen    Collection Time: 01/23/20  5:37 PM   Result Value Ref Range    ABO Grouping A     Rh Factor Negative     Antibody Screen Positive     Specimen Expiration Date [id number]    Antibody identification    Collection Time: 01/23/20  5:37 PM   Result Value Ref Range    ANTIBODY ID   #1 Passive D Antibody, Patient Received RHIG    Blood gas, arterial, cord    Collection Time: 01/23/20 10:10 PM   Result Value Ref Range    pH, Cord Art 7 253 7 230 - 7 430    pCO2, Cord Art 57 6 30 0 - 60 0    pO2, Cord Art 17 1 5 0 - 25 0 mm HG    HCO3, Cord Art 24 9 17 3 - 27 3 mmol/L    Base Exc, Cord Art -3 4 (L) 3 0 - 11 0 mmol/L    O2 Content, Cord Art 7 2 ml/dl    O2 Hgb, Arterial Cord 32 3 %   Blood gas, venous, cord    Collection Time: 01/23/20 10:10 PM   Result Value Ref Range    pH, Cord Tomasz 7 334 7 190 - 7 490    pCO2, Cord Tomasz 42 9 27 0 - 43 0 mm HG    pO2, Cord Tomasz 25 1 15 0 - 45 0 mm HG    HCO3, Cord Tomasz 22 3 12 2 - 28 6 mmol/L    Base Exc, Cord Tomasz -3 5 (L) 1 0 - 9 0 mmol/L    O2 Cont, Cord Tomasz 14 1 mL/dL    O2 HGB,VENOUS CORD 62 0 %     Meds:    docusate sodium 100 mg Oral BID   prenatal multivitamin 1 tablet Oral Daily   Rho(D) immune globulin 300 mcg Intramuscular Once       acetaminophen 650 mg Q4H PRN   calcium carbonate 1,000 mg Daily PRN   hydrocortisone 1 application PRN   ibuprofen 600 mg Q6H PRN   magnesium hydroxide 30 mL Daily PRN   simethicone 80 mg 4x Daily PRN   witch hazel-glycerin 1 pad PRN             Signature / Title: Glenis Parry DO, Family Medicine, PGY-1  Date: 1/24/2020  Time: 5:46 AM

## 2020-01-24 NOTE — PLAN OF CARE
Problem: Knowledge Deficit  Goal: Verbalizes understanding of labor plan  Description  Assess patient/family/caregiver's baseline knowledge level and ability to understand information  Provide education via patient/family/caregiver's preferred learning method at appropriate level of understanding  1  Provide teaching at level of understanding  2  Provide teaching via preferred learning method(s)  Outcome: Progressing     Problem: PAIN - ADULT  Goal: Verbalizes/displays adequate comfort level or baseline comfort level  Description  Interventions:  - Encourage patient to monitor pain and request assistance  - Assess pain using appropriate pain scale  - Administer analgesics based on type and severity of pain and evaluate response  - Implement non-pharmacological measures as appropriate and evaluate response  - Consider cultural and social influences on pain and pain management  - Notify physician/advanced practitioner if interventions unsuccessful or patient reports new pain  Outcome: Progressing     Problem: SAFETY ADULT  Goal: Patient will remain free of falls  Description  INTERVENTIONS:  - Assess patient frequently for physical needs  -  Identify cognitive and physical deficits and behaviors that affect risk of falls    -  [place] fall precautions as indicated by assessment   - Educate patient/family on patient safety including physical limitations  - Instruct patient to call for assistance with activity based on assessment  - Modify environment to reduce risk of injury  - Consider OT/PT consult to assist with strengthening/mobility  Outcome: Progressing  Goal: Maintain or return to baseline ADL function  Description  INTERVENTIONS:  -  Assess patient's ability to carry out ADLs; assess patient's baseline for ADL function and identify physical deficits which impact ability to perform ADLs (bathing, care of mouth/teeth, toileting, grooming, dressing, etc )  - Assess/evaluate cause of self-care deficits - Assess range of motion  - Assess patient's mobility; develop plan if impaired  - Assess patient's need for assistive devices and provide as appropriate  - Encourage maximum independence but intervene and supervise when necessary  - Involve family in performance of ADLs  - Assess for home care needs following discharge   - Consider OT consult to assist with ADL evaluation and planning for discharge  - Provide patient education as appropriate  Outcome: Progressing  Goal: Maintain or return mobility status to optimal level  Description  INTERVENTIONS:  - Assess patient's baseline mobility status (ambulation, transfers, stairs, etc )    - Identify cognitive and physical deficits and behaviors that affect mobility  - Identify mobility aids required to assist with transfers and/or ambulation (gait belt, sit-to-stand, lift, walker, cane, etc )  - [place] fall precautions as indicated by assessment  - Record patient progress and toleration of activity level on Mobility SBAR; progress patient to next Phase/Stage  - Instruct patient to call for assistance with activity based on assessment  - Consider rehabilitation consult to assist with strengthening/weightbearing, etc   Outcome: Progressing     Problem: DISCHARGE PLANNING  Goal: Discharge to home or other facility with appropriate resources  Description  INTERVENTIONS:  - Identify barriers to discharge w/patient and caregiver  - Arrange for needed discharge resources and transportation as appropriate  - Identify discharge learning needs (meds, wound care, etc )  - Arrange for interpretive services to assist at discharge as needed  - Refer to Case Management Department for coordinating discharge planning if the patient needs post-hospital services based on physician/advanced practitioner order or complex needs related to functional status, cognitive ability, or social support system  Outcome: Progressing     Problem: POSTPARTUM  Goal: Experiences normal postpartum course  Description  INTERVENTIONS:  - Monitor maternal vital signs  - Assess uterine involution and lochia  Outcome: Progressing  Goal: Appropriate maternal -  bonding  Description  INTERVENTIONS:  - Identify family support  - Assess for appropriate maternal/infant bonding   -Encourage maternal/infant bonding opportunities  - Referral to  or  as needed  Outcome: Progressing  Goal: Establishment of infant feeding pattern  Description  INTERVENTIONS:  - Assess breast/bottle feeding  - Refer to lactation as needed  Outcome: Progressing     Problem: ALTERATION IN THE BREAST  Goal: Optimize infant feeding at the breast  Description  INTERVENTIONS:  - Latch, breast and nipple assessment  - Assess prior breast feeding history  - Hand expression of breast milk  - For cracked, bleeding and or sore nipples reassess latch, treat damaged nipple  -Educate mother on feeding cues  -Positioning/latch techniques  Outcome: Progressing

## 2020-01-24 NOTE — ANESTHESIA POSTPROCEDURE EVALUATION
Post-Op Assessment Note    CV Status:  Stable  Pain Score: 0    Pain management: adequate     Mental Status:  Alert and awake   Hydration Status:  Euvolemic   PONV Controlled:  Controlled   Airway Patency:  Patent   Post Op Vitals Reviewed: Yes      Staff: CRNA     Post-op block assessment: no complications and catheter intact        BP   134/60   Temp      Pulse  90   Resp   16   SpO2   99

## 2020-01-24 NOTE — L&D DELIVERY NOTE
DELIVERY NOTE  William Diane 21 y o  female MRN: [de-identified]  Unit/Bed#: -01 Encounter: [id number]    Obstetrician:    Dr Bretta Goldberg, MD    Assistant:   Dr Leonides Denton    Pre-Delivery Diagnosis:   Patient Active Problem List   Diagnosis    Back pain affecting pregnancy in third trimester    Pregnancy complicated by nephrolithiasis in third trimester, antepartum    45 weeks gestation of pregnancy     (spontaneous vaginal delivery)       Post-Delivery Diagnosis:   Same as above - Delivered  2nd degree laceration    Procedure:  Spontaneous vaginal delivery  Repair 2nd degree spontaneous laceration      Anesthesia:  epidural    Specimens:   Cord blood obtained   Placenta; normal appearing, central insertion, intact   Arterial and venous blood gases (below)     Gases:  Umbilical Cord Venous Blood Gas:  Results from last 7 days   Lab Units 20  2210   PH COV  7 334   PCO2 COV mm HG 42 9   HCO3 COV mmol/L 22 3   BASE EXC COV mmol/L -3 5*   O2 CT CD VB mL/dL 14 1   O2 HGB, VENOUS CORD % 77 9     Umbilical Cord Arterial Blood Gas:  Results from last 7 days   Lab Units 20  2210   PH COA  7 253   PCO2 COA  57 6   PO2 COA mm HG 17 1   HCO3 COA mmol/L 24 9   BASE EXC COA mmol/L -3 4*   O2 CONTENT CORD ART ml/dl 7 2   O2 HGB, ARTERIAL CORD % 32 3       Quantitative Blood Loss:   83 mL           Complications:    None    Brief Description of Labor Course:  William Diane is a 21 y o   female at 38w5d who was admitted to L&D for labor  Her labor course was notable for AROM for labor augmentation and epidural for pain management  She progressed to complete at , and began pushing at , during which time warm compresses and perineal massage were implemented  She pushed for 51 min, and delivered a healthy  at   Description of Delivery:   With  the assistance of maternal expulsive forces, the fetal vertex delivered spontaneously  A nuchal cord was not noted   The anterior right shoulder was delivered atraumatically with gentle downward traction  The contralateral arm was delivered with gentle upward traction  The remainder of the fetus delivered spontaneously at 2207, resulting in a viable male   Upon delivery, the infant was placed on the mothers abdomen and the cord was clamped and cut after 30 seconds  The  was noted to have good tone and cry spontaneously  There was no evidence of injury  There was noted to be a large, cystic, mobile structure on the left side of the 's neck  The  was passed off to  staff for evaluation  Umbilical cord blood and umbilical artery and venous gases were collected and sent to the lab  An intact placenta was delivered spontaneously at  using fundal massage and gentle cord traction and was noted to have a centrally-inserted 3-vessel cord  Active management of the third stage of labor was undertaken with IV pitocin at 250 milliunits/min  Inspection of the perineum, vagina, labia, cervix, and urethra revealed a 2nd degree laceration with intact perineum and minimal depth  3-0 vicryl rapide was selected for the repair suture  The patient had an epidural in place for anesthesia  The apex of the vaginal laceration was identified and an anchoring suture was placed 1 cm above the apex  The vaginal mucosa and underlying rectovaginal fascia were closed in a running locked fashion to the hymenal ring  Excellent hemostasis was achieved  Bleeding was noted to be under control  A bimanual exam was performed   Outcome:  Living  with APGARS 9 (1 min) and 9 (5 min)   weight: 7lb 12oz     At the conclusion of the delivery, all needle, sponge, and instrument counts were noted to be correct  Patient tolerated the procedure well and was allowed to recover in labor and delivery room with family and  before being transferred to the post-partum floor       Conclusion:  Mother and baby are currently recovering nicely in stable condition  NICU was called in after delivery to evaluate the cystic neck structure  The  was noted to be stable  Attending Supervision:   Dr Jennifer Govea MD was present for the entire procedure      Sepideh Alarcon MD  PGY-1 OB/GYN   2020 10:40 PM

## 2020-01-24 NOTE — DISCHARGE SUMMARY
Discharge Summary - OB/GYN   Maegan Gonzalez 21 y o  female MRN: [de-identified]  Unit/Bed#: -01 Encounter: [id number]      Admission Date: 2020     Discharge Date: 2020    Admitting Diagnosis:   1  Pregnancy at 38w4d    Discharge Diagnosis:   Same, delivered   with cystic hygroma     Procedures: spontaneous vaginal delivery    Attending: Elvis Crowder MD    Hospital Course:     Maegan Gonzalez is a 21 y o   at 38w4d wks who was initially admitted for labor  Her labor course was notable for AROM for labor augmentation and epidural for pain management  She progressed to complete at , and began pushing at , during which time warm compresses and perineal massage were implemented  She pushed for 51 min, and delivered a healthy  at 7  She delivered a viable male  on 2020 at 2207  Weight 7lbs 12oz via spontaneous vaginal delivery  Apgars were 9 (1 min) and 9 (5 min)   stayed in the delivery room after birth  Patient tolerated the procedure well and was transferred to recovery in stable condition  Her post-partum course was uncomplicated  Her post-partum pain was well controlled with oral analgesics  On day of discharge, she was ambulating and able to reasonably perform all ADLs  She was voiding and had appropriate bowel function  Pain was well controlled  She was discharged home on post-partum day #2 without complications  Patient was instructed to follow up with her OB as an outpatient and was given appropriate warnings to call provider if she develops signs of infection or uncontrolled pain  Complications: none apparent    Condition at discharge: good     Discharge instructions/Information to patient and family:   See after visit summary for information provided to patient and family  Provisions for Follow-Up Care:  See after visit summary for information related to follow-up care and any pertinent home health orders        Disposition: Home    Planned Readmission: No    Discharge instructions/Information to patient and family:   - Do not place anything (no partner, tampons or douche) in your vagina for 6 weeks  -You may walk for exercise for the first 6 weeks then gradually return to your usual activities    -Please do not drive for 1 week if you have no stitches and for 2 weeks if you have stitches or underwent a  delivery     -You may take baths or shower per your preference    -Please look at your bust (breasts) in the mirror daily and call for redness or tenderness or increased warmth    -Please call us for temperature > 100 4*F or 38* C, worsening pain or a foul discharge  Discharge Medications:   Prenatal vitamin daily for 6 months or the duration of nursing whichever is longer  Motrin 600 mg orally every 6 hours as needed for pain  Tylenol (over the counter) per bottle directions as needed for pain: do NOT use with percocet  Hydrocortisone cream 1% (over the counter) applied 1-2x daily to hemorrhoids as needed    Provisions for Follow-Up Care: Follow up with your doctor in 3 weeks for postpartum care

## 2020-01-24 NOTE — PROGRESS NOTES
Patient c/o SOB with exertion and rest  Difficulty taking deep breaths  Denies chest pain or calf pain  Lungs clear  VSS  Dr Meléndez Patches made aware and in to assess patient  Will continue to monitor

## 2020-01-25 VITALS
SYSTOLIC BLOOD PRESSURE: 116 MMHG | DIASTOLIC BLOOD PRESSURE: 68 MMHG | BODY MASS INDEX: 30.77 KG/M2 | TEMPERATURE: 97.5 F | HEART RATE: 59 BPM | RESPIRATION RATE: 18 BRPM | OXYGEN SATURATION: 99 % | WEIGHT: 203 LBS | HEIGHT: 68 IN

## 2020-01-25 PROCEDURE — 99024 POSTOP FOLLOW-UP VISIT: CPT | Performed by: OBSTETRICS & GYNECOLOGY

## 2020-01-25 RX ORDER — ACETAMINOPHEN 325 MG/1
650 TABLET ORAL EVERY 4 HOURS PRN
Qty: 30 TABLET | Refills: 0 | Status: SHIPPED | OUTPATIENT
Start: 2020-01-25 | End: 2021-02-09 | Stop reason: ALTCHOICE

## 2020-01-25 RX ORDER — IBUPROFEN 600 MG/1
600 TABLET ORAL EVERY 6 HOURS PRN
Qty: 30 TABLET | Refills: 0 | Status: SHIPPED | OUTPATIENT
Start: 2020-01-25 | End: 2021-02-09 | Stop reason: ALTCHOICE

## 2020-01-25 RX ORDER — DOCUSATE SODIUM 100 MG/1
100 CAPSULE, LIQUID FILLED ORAL 2 TIMES DAILY
Qty: 10 CAPSULE | Refills: 0 | Status: SHIPPED | OUTPATIENT
Start: 2020-01-25 | End: 2021-02-09 | Stop reason: ALTCHOICE

## 2020-01-25 RX ADMIN — DOCUSATE SODIUM 100 MG: 100 CAPSULE, LIQUID FILLED ORAL at 08:24

## 2020-01-25 RX ADMIN — IBUPROFEN 600 MG: 600 TABLET ORAL at 06:25

## 2020-01-25 RX ADMIN — IBUPROFEN 600 MG: 600 TABLET ORAL at 15:50

## 2020-01-25 RX ADMIN — ACETAMINOPHEN 650 MG: 325 TABLET, FILM COATED ORAL at 08:23

## 2020-01-25 RX ADMIN — Medication 1 TABLET: at 08:24

## 2020-01-25 NOTE — PLAN OF CARE
Problem: Knowledge Deficit  Goal: Verbalizes understanding of labor plan  Description  Assess patient/family/caregiver's baseline knowledge level and ability to understand information  Provide education via patient/family/caregiver's preferred learning method at appropriate level of understanding  1  Provide teaching at level of understanding  2  Provide teaching via preferred learning method(s)  Outcome: Completed     Problem: PAIN - ADULT  Goal: Verbalizes/displays adequate comfort level or baseline comfort level  Description  Interventions:  - Encourage patient to monitor pain and request assistance  - Assess pain using appropriate pain scale  - Administer analgesics based on type and severity of pain and evaluate response  - Implement non-pharmacological measures as appropriate and evaluate response  - Consider cultural and social influences on pain and pain management  - Notify physician/advanced practitioner if interventions unsuccessful or patient reports new pain  Outcome: Completed     Problem: SAFETY ADULT  Goal: Patient will remain free of falls  Description  INTERVENTIONS:  - Assess patient frequently for physical needs  -  Identify cognitive and physical deficits and behaviors that affect risk of falls    -  [place] fall precautions as indicated by assessment   - Educate patient/family on patient safety including physical limitations  - Instruct patient to call for assistance with activity based on assessment  - Modify environment to reduce risk of injury  - Consider OT/PT consult to assist with strengthening/mobility  Outcome: Completed  Goal: Maintain or return to baseline ADL function  Description  INTERVENTIONS:  -  Assess patient's ability to carry out ADLs; assess patient's baseline for ADL function and identify physical deficits which impact ability to perform ADLs (bathing, care of mouth/teeth, toileting, grooming, dressing, etc )  - Assess/evaluate cause of self-care deficits   - Assess range of motion  - Assess patient's mobility; develop plan if impaired  - Assess patient's need for assistive devices and provide as appropriate  - Encourage maximum independence but intervene and supervise when necessary  - Involve family in performance of ADLs  - Assess for home care needs following discharge   - Consider OT consult to assist with ADL evaluation and planning for discharge  - Provide patient education as appropriate  Outcome: Completed  Goal: Maintain or return mobility status to optimal level  Description  INTERVENTIONS:  - Assess patient's baseline mobility status (ambulation, transfers, stairs, etc )    - Identify cognitive and physical deficits and behaviors that affect mobility  - Identify mobility aids required to assist with transfers and/or ambulation (gait belt, sit-to-stand, lift, walker, cane, etc )  - [place] fall precautions as indicated by assessment  - Record patient progress and toleration of activity level on Mobility SBAR; progress patient to next Phase/Stage  - Instruct patient to call for assistance with activity based on assessment  - Consider rehabilitation consult to assist with strengthening/weightbearing, etc   Outcome: Completed     Problem: DISCHARGE PLANNING  Goal: Discharge to home or other facility with appropriate resources  Description  INTERVENTIONS:  - Identify barriers to discharge w/patient and caregiver  - Arrange for needed discharge resources and transportation as appropriate  - Identify discharge learning needs (meds, wound care, etc )  - Arrange for interpretive services to assist at discharge as needed  - Refer to Case Management Department for coordinating discharge planning if the patient needs post-hospital services based on physician/advanced practitioner order or complex needs related to functional status, cognitive ability, or social support system  Outcome: Completed     Problem: POSTPARTUM  Goal: Experiences normal postpartum course  Description  INTERVENTIONS:  - Monitor maternal vital signs  - Assess uterine involution and lochia  Outcome: Completed  Goal: Appropriate maternal -  bonding  Description  INTERVENTIONS:  - Identify family support  - Assess for appropriate maternal/infant bonding   -Encourage maternal/infant bonding opportunities  - Referral to  or  as needed  Outcome: Completed  Goal: Establishment of infant feeding pattern  Description  INTERVENTIONS:  - Assess breast/bottle feeding  - Refer to lactation as needed  Outcome: Completed     Problem: ALTERATION IN THE BREAST  Goal: Optimize infant feeding at the breast  Description  INTERVENTIONS:  - Latch, breast and nipple assessment  - Assess prior breast feeding history  - Hand expression of breast milk  - For cracked, bleeding and or sore nipples reassess latch, treat damaged nipple  -Educate mother on feeding cues  -Positioning/latch techniques  Outcome: Completed

## 2020-01-25 NOTE — LACTATION NOTE
This note was copied from a baby's chart  Infant assisted to breast in cross cradle hold  Infant making some attempts, but no latch obtained  Feeding options discussed with parents  Mom pumped breasts and obtained a few drops of colostrum  Infant then placed at breast with nipple shield in place  Infant did suck well and transferred small amount of colostrum  Mom encouraged to continue to pump after each feeding and finger/syringe feed any obtained colostrum to infant until feeding well

## 2020-01-25 NOTE — PLAN OF CARE
Problem: Knowledge Deficit  Goal: Verbalizes understanding of labor plan  Description  Assess patient/family/caregiver's baseline knowledge level and ability to understand information  Provide education via patient/family/caregiver's preferred learning method at appropriate level of understanding  1  Provide teaching at level of understanding  2  Provide teaching via preferred learning method(s)  Outcome: Progressing     Problem: PAIN - ADULT  Goal: Verbalizes/displays adequate comfort level or baseline comfort level  Description  Interventions:  - Encourage patient to monitor pain and request assistance  - Assess pain using appropriate pain scale  - Administer analgesics based on type and severity of pain and evaluate response  - Implement non-pharmacological measures as appropriate and evaluate response  - Consider cultural and social influences on pain and pain management  - Notify physician/advanced practitioner if interventions unsuccessful or patient reports new pain  Outcome: Progressing     Problem: SAFETY ADULT  Goal: Patient will remain free of falls  Description  INTERVENTIONS:  - Assess patient frequently for physical needs  -  Identify cognitive and physical deficits and behaviors that affect risk of falls    -  [place] fall precautions as indicated by assessment   - Educate patient/family on patient safety including physical limitations  - Instruct patient to call for assistance with activity based on assessment  - Modify environment to reduce risk of injury  - Consider OT/PT consult to assist with strengthening/mobility  Outcome: Progressing  Goal: Maintain or return to baseline ADL function  Description  INTERVENTIONS:  -  Assess patient's ability to carry out ADLs; assess patient's baseline for ADL function and identify physical deficits which impact ability to perform ADLs (bathing, care of mouth/teeth, toileting, grooming, dressing, etc )  - Assess/evaluate cause of self-care deficits - Assess range of motion  - Assess patient's mobility; develop plan if impaired  - Assess patient's need for assistive devices and provide as appropriate  - Encourage maximum independence but intervene and supervise when necessary  - Involve family in performance of ADLs  - Assess for home care needs following discharge   - Consider OT consult to assist with ADL evaluation and planning for discharge  - Provide patient education as appropriate  Outcome: Progressing  Goal: Maintain or return mobility status to optimal level  Description  INTERVENTIONS:  - Assess patient's baseline mobility status (ambulation, transfers, stairs, etc )    - Identify cognitive and physical deficits and behaviors that affect mobility  - Identify mobility aids required to assist with transfers and/or ambulation (gait belt, sit-to-stand, lift, walker, cane, etc )  - [place] fall precautions as indicated by assessment  - Record patient progress and toleration of activity level on Mobility SBAR; progress patient to next Phase/Stage  - Instruct patient to call for assistance with activity based on assessment  - Consider rehabilitation consult to assist with strengthening/weightbearing, etc   Outcome: Progressing     Problem: DISCHARGE PLANNING  Goal: Discharge to home or other facility with appropriate resources  Description  INTERVENTIONS:  - Identify barriers to discharge w/patient and caregiver  - Arrange for needed discharge resources and transportation as appropriate  - Identify discharge learning needs (meds, wound care, etc )  - Arrange for interpretive services to assist at discharge as needed  - Refer to Case Management Department for coordinating discharge planning if the patient needs post-hospital services based on physician/advanced practitioner order or complex needs related to functional status, cognitive ability, or social support system  Outcome: Progressing     Problem: POSTPARTUM  Goal: Experiences normal postpartum course  Description  INTERVENTIONS:  - Monitor maternal vital signs  - Assess uterine involution and lochia  Outcome: Progressing  Goal: Appropriate maternal -  bonding  Description  INTERVENTIONS:  - Identify family support  - Assess for appropriate maternal/infant bonding   -Encourage maternal/infant bonding opportunities  - Referral to  or  as needed  Outcome: Progressing  Goal: Establishment of infant feeding pattern  Description  INTERVENTIONS:  - Assess breast/bottle feeding  - Refer to lactation as needed  Outcome: Progressing     Problem: ALTERATION IN THE BREAST  Goal: Optimize infant feeding at the breast  Description  INTERVENTIONS:  - Latch, breast and nipple assessment  - Assess prior breast feeding history  - Hand expression of breast milk  - For cracked, bleeding and or sore nipples reassess latch, treat damaged nipple  -Educate mother on feeding cues  -Positioning/latch techniques  Outcome: Progressing

## 2020-01-25 NOTE — PROGRESS NOTES
Progress Note - OB/GYN   Tiara Hanks 21 y o  female MRN: [de-identified]  Unit/Bed#: -01 Encounter: [id number]    Assessment:  21 y o  Rohini Funes s/p Spontaneous Vaginal Delivery Postpartum day  2    Plan:  1  Routine post-partum care  -Encourage ambulation  -Pain control as needed  -Advance diet as tolerated  -Rhogam not indicated    2  Anticipate discharge today    Subjective/Objective   Chief Complaint:       Subjective:  Tiara Hanks is well appearing and had no acute events overnight  Pain: Well controlled with pain medication regimen  Tolerating PO: yes  Voiding: yes  Flatus: yes  BM: no  Ambulating: yes  Breastfeeding: yes  Chest pain: no  Shortness of breath: no  Leg pain: no  Lochia: Decreasing    Objective:     Vitals: Blood pressure 104/64, pulse 64, temperature 98 1 °F (36 7 °C), temperature source Oral, resp  rate 18, height 5' 7 5" (1 715 m), weight 92 1 kg (203 lb), last menstrual period 04/28/2019, SpO2 99 %, currently breastfeeding    No intake or output data in the 24 hours ending 01/25/20 1914    Physical Exam:     General: NAD  Cardiovascular: RRR, no murmur, nl S1/S2   Lungs: CTAB, non-labored breathing   Abdomen: Soft, no distension/rebound/guarding/tenderness   Fundus: Firm, non-tender, fundus: -1 cm below the umbilicus   Lower Extremities: Non-tender    Lab, Imaging and other studies:     Recent Results (from the past 72 hour(s))   CBC    Collection Time: 01/23/20  4:07 PM   Result Value Ref Range    WBC 17 04 (H) 4 31 - 10 16 Thousand/uL    RBC 4 04 3 81 - 5 12 Million/uL    Hemoglobin 13 2 11 5 - 15 4 g/dL    Hematocrit 37 8 34 8 - 46 1 %    MCV 94 82 - 98 fL    MCH 32 7 26 8 - 34 3 pg    MCHC 34 9 31 4 - 37 4 g/dL    RDW 11 9 11 6 - 15 1 %    Platelets 136 866 - 370 Thousands/uL    MPV 10 3 8 9 - 12 7 fL   RPR    Collection Time: 01/23/20  4:07 PM   Result Value Ref Range    RPR Non-Reactive Non-Reactive   Type and screen    Collection Time: 01/23/20  5:37 PM   Result Value Ref Range ABO Grouping A     Rh Factor Negative     Antibody Screen Positive     Specimen Expiration Date [id number]    Antibody identification    Collection Time: 01/23/20  5:37 PM   Result Value Ref Range    ANTIBODY ID   #1 Passive D Antibody, Patient Received RHIG    Blood gas, arterial, cord    Collection Time: 01/23/20 10:10 PM   Result Value Ref Range    pH, Cord Art 7 253 7 230 - 7 430    pCO2, Cord Art 57 6 30 0 - 60 0    pO2, Cord Art 17 1 5 0 - 25 0 mm HG    HCO3, Cord Art 24 9 17 3 - 27 3 mmol/L    Base Exc, Cord Art -3 4 (L) 3 0 - 11 0 mmol/L    O2 Content, Cord Art 7 2 ml/dl    O2 Hgb, Arterial Cord 32 3 %   Blood gas, venous, cord    Collection Time: 01/23/20 10:10 PM   Result Value Ref Range    pH, Cord Tomasz 7 334 7 190 - 7 490    pCO2, Cord Tomasz 42 9 27 0 - 43 0 mm HG    pO2, Cord Tomasz 25 1 15 0 - 45 0 mm HG    HCO3, Cord Tomasz 22 3 12 2 - 28 6 mmol/L    Base Exc, Cord Tomasz -3 5 (L) 1 0 - 9 0 mmol/L    O2 Cont, Cord Tomasz 14 1 mL/dL    O2 HGB,VENOUS CORD 62 0 %     Meds:    docusate sodium 100 mg Oral BID   prenatal multivitamin 1 tablet Oral Daily       acetaminophen 650 mg Q4H PRN   calcium carbonate 1,000 mg Daily PRN   hydrocortisone 1 application PRN   ibuprofen 600 mg Q6H PRN   magnesium hydroxide 30 mL Daily PRN   simethicone 80 mg 4x Daily PRN   witch hazel-glycerin 1 pad PRN             Signature / Title: Delphia Gosselin, DO, Family Medicine, PGY-1  Date: 1/25/2020  Time: 6:29 AM

## 2020-01-25 NOTE — LACTATION NOTE
This note was copied from a baby's chart  Mom states infant has been having some latch difficulties but did have a good feeding this AM  Discussed having her start pumping after feeds and finger/syringe feeding infant any obtained colostrum until feeding well  To call for assistance and latch check with next feeding  Reviewed expected infant feeding changes in the next few days, engorgement relief measures, signs of milk transfer, use of feeding log and when and where to call for additional assistance as needed  Given discharge breastfeeding pkat and same reviewed

## 2020-01-26 ENCOUNTER — TELEPHONE (OUTPATIENT)
Dept: NURSERY | Facility: HOSPITAL | Age: 25
End: 2020-01-26

## 2020-01-26 NOTE — TELEPHONE ENCOUNTER
Received telephone call from patient who was discharged yesterday breastfeeding with a nipple shield  Patient states infant is feeding well with shield and her milk is in  Infant fed every 2 hours all night and is wetting and stooling well  Had some general questions abut how long she could use the shield  Discussed when and how to wean off of shield and pumping

## 2020-02-03 LAB — PLACENTA IN STORAGE: NORMAL

## 2020-02-17 ENCOUNTER — POSTPARTUM VISIT (OUTPATIENT)
Dept: OBGYN CLINIC | Facility: CLINIC | Age: 25
End: 2020-02-17

## 2020-02-17 VITALS
WEIGHT: 183 LBS | BODY MASS INDEX: 27.74 KG/M2 | SYSTOLIC BLOOD PRESSURE: 92 MMHG | HEIGHT: 68 IN | DIASTOLIC BLOOD PRESSURE: 68 MMHG

## 2020-02-17 PROCEDURE — 99024 POSTOP FOLLOW-UP VISIT: CPT | Performed by: OBSTETRICS & GYNECOLOGY

## 2020-02-17 NOTE — PROGRESS NOTES
3 WK POSTPARTUM APPT:      SAVD 2020  7 LB 12 OZ  "MARIA FERNANDA"    Transfer @ 31 wk from [place] - no bleeding since last wk  Normal bowel & bladder habits  Taking prenatal vits w/ dha  Breastfeeding q 2-3 hrs - using breast shield   Ped - [facility]) - gaining weight, sl jaundice  Birth control - prev on ocps - discussed Depo Provera, IUD - will consider  Will return to work 2020  Had Rhogam during pregnancy & postpartum  Did not get TDAP during pregnancy - will get now  Completed [place] Dep scale (score = 5)  Her mom staying with them from [place] for another 2 wks  Pt had abn pap in preg - ASCUS, (+) HPV - had colpo look during preg -discussed repeat colpo after 6 wk appt  Postpartum packet given

## 2020-03-06 ENCOUNTER — POSTPARTUM VISIT (OUTPATIENT)
Dept: OBGYN CLINIC | Facility: CLINIC | Age: 25
End: 2020-03-06

## 2020-03-06 VITALS
DIASTOLIC BLOOD PRESSURE: 74 MMHG | WEIGHT: 180.2 LBS | BODY MASS INDEX: 27.31 KG/M2 | SYSTOLIC BLOOD PRESSURE: 110 MMHG | HEIGHT: 68 IN

## 2020-03-06 PROCEDURE — 99024 POSTOP FOLLOW-UP VISIT: CPT | Performed by: OBSTETRICS & GYNECOLOGY

## 2020-03-06 NOTE — PATIENT INSTRUCTIONS
Patient doing well infant doing well nursing is going well no evidence postpartum depression  Examination within normal limits  Return to office in 2 months for repeat Pap smear

## 2020-03-06 NOTE — PROGRESS NOTES
This is a 14-year-old female who is approximately 6 weeks status post vaginal delivery she is doing well infant's doing well there is no evidence of postpartum depression or baby blues  She is happy with her weight loss  Nursing is going well  She will use condoms for contraception  Examination of the breasts the abdomen and pelvic exam all within normal limits  Vagina is well healed  She does have a history of an abnormal Pap smear will repeat a Pap smear in 2 months  All restrictions lifted return to office in 2 months for repeat Pap smear

## 2020-03-12 ENCOUNTER — TELEPHONE (OUTPATIENT)
Dept: OBGYN CLINIC | Facility: CLINIC | Age: 25
End: 2020-03-12

## 2020-03-12 NOTE — TELEPHONE ENCOUNTER
Patient is calling with bleeding  Has only changed pad one time in 6 hours  Not fully saturated  No clots or cramping  Spoke with Ana Restrepo says most likely first    Patient is aware to watch  Also to call us if changes or gets heavier than a pne or two pads a hour or last longer then a week and pass clots  Also to call back with other concerns or prn

## 2020-05-29 ENCOUNTER — TELEPHONE (OUTPATIENT)
Dept: OBGYN CLINIC | Facility: CLINIC | Age: 25
End: 2020-05-29

## 2020-05-29 DIAGNOSIS — N61.0 ACUTE MASTITIS OF LEFT BREAST: Primary | ICD-10-CM

## 2020-05-29 RX ORDER — CEPHALEXIN 500 MG/1
500 CAPSULE ORAL EVERY 8 HOURS SCHEDULED
Qty: 21 CAPSULE | Refills: 0 | Status: SHIPPED | OUTPATIENT
Start: 2020-05-29 | End: 2020-06-05

## 2020-07-06 ENCOUNTER — TELEPHONE (OUTPATIENT)
Dept: OBGYN CLINIC | Facility: CLINIC | Age: 25
End: 2020-07-06

## 2020-07-06 DIAGNOSIS — F41.8 POSTPARTUM ANXIETY: Primary | ICD-10-CM

## 2020-07-06 NOTE — TELEPHONE ENCOUNTER
Pt is 5 months postpartum  - has been feeling increased anxiety past 2 wks - more sleep deprivation recently - baby with reflux issue & has followed up with ped - baby gaining weight  States she sometimes thinks "bad thoughts" about herself or baby  She is now pumping & baby taking breast milk instead of breastfeeding  Pt states some days she feels better when baby is doing better   supportive & has encouraged her to call office  recom for pt to f/u behavioral health @ [address]  DOUG informed  Will start Zoloft 50 mg now & f/u appt this wk  Pt informed & would like to start Zoloft  Offered appt for 2020 - pt req appt for next wk - sched for 2020  Pt to recall prn    Please sign off on presc to RA (PABLO Lizarraga FOCUS Trainr)

## 2020-07-14 ENCOUNTER — TELEMEDICINE (OUTPATIENT)
Dept: OBGYN CLINIC | Facility: CLINIC | Age: 25
End: 2020-07-14
Payer: COMMERCIAL

## 2020-07-14 DIAGNOSIS — F41.8 POSTPARTUM ANXIETY: Primary | ICD-10-CM

## 2020-07-14 PROCEDURE — 99214 OFFICE O/P EST MOD 30 MIN: CPT | Performed by: OBSTETRICS & GYNECOLOGY

## 2020-07-14 NOTE — PROGRESS NOTES
Virtual Regular Visit      Assessment/Plan:  Postpartum anxiety, the patient started using Zoloft yesterday  She has no desire to hurt herself or her infant  She will call me at the end a week for progress report  She has good support at home by her   The infant is scheduled for surgery at [facility] next month for hypospadia , and a drainage of a fluid cyst on his neck a benign procedure  The patient will make an appointment to see me person the office within the month  She she will need a yearly exam and a Pap smear  Problem List Items Addressed This Visit     None      Visit Diagnoses     Postpartum anxiety    -  Primary               Reason for visit is   Chief Complaint   Patient presents with    Virtual Regular Visit        Encounter provider Sharan Seip, MD    Provider located at [address] [place] [id number] [phone]      Recent Visits  No visits were found meeting these conditions  Showing recent visits within past 7 days and meeting all other requirements     Future Appointments  No visits were found meeting these conditions  Showing future appointments within next 150 days and meeting all other requirements        The patient was identified by name and date of birth  Carine Fatima was informed that this is a telemedicine visit and that the visit is being conducted through [employer]  My office door was closed  No one else was in the room  She acknowledged consent and understanding of privacy and security of the video platform  The patient has agreed to participate and understands they can discontinue the visit at any time  Patient is aware this is a billable service  Subjective  Carine Fatima is a 31 y o  female , she had a vaginal delivery in January of 2020  At that time the infant was noted to have a fluid cyst on the neck thought to be benign    The patient called our office with the last 2 weeks complaining of increased anxiety and stress worried about the surgery  She does have a history of stress in the past   She was supposed to start her SSRI, Zoloft, a week ago she just started yesterday  She has had no crying in the last 48 hours  She is actually dealing well with the COVID situation  Overall she thinks she is doing well  She is still nursing was start to wean soon  I think that will help situation also  HPI     Past Medical History:   Diagnosis Date    Abnormal Pap smear of cervix     ASCUS, (+) HPV - HAD COLPO DURING PREG - NO BIOPSIES - F/U AFTER DEL    HPV (human papilloma virus) infection     Migraine     beg of pregnancy related to celiac    Rh incompatibility     had Rhogam @ 14 wk (bleeding) & 28 wk       Past Surgical History:   Procedure Laterality Date    AUGMENTATION BREAST      2016       Current Outpatient Medications   Medication Sig Dispense Refill    acetaminophen (TYLENOL) 325 mg tablet Take 2 tablets (650 mg total) by mouth every 4 (four) hours as needed for mild pain, moderate pain, severe pain or fever (Patient not taking: Reported on 2/17/2020) 30 tablet 0    docusate sodium (COLACE) 100 mg capsule Take 1 capsule (100 mg total) by mouth 2 (two) times a day (Patient not taking: Reported on 2/17/2020) 10 capsule 0    ibuprofen (MOTRIN) 600 mg tablet Take 1 tablet (600 mg total) by mouth every 6 (six) hours as needed (cramping) (Patient not taking: Reported on 2/17/2020) 30 tablet 0    Prenatal Vit-Fe Fumarate-FA (PRENATAL VITAMIN PO) Take 2 tablets by mouth daily      sertraline (ZOLOFT) 50 mg tablet Take 1 tablet (50 mg total) by mouth daily 30 tablet 2     No current facility-administered medications for this visit  No Known Allergies    Review of Systems  The patient denies suicidal tendency  She she denies any plans her her infant  She she is happily  has good support from her     She is somewhat concerned about the future surgery of her infant  She will start to wean soon  She will call me to enter with with a progress report  She just started Zoloft yesterday  Video Exam  Visualization of the patient via the video show she is alert oriented she is neatly dressed in clean  There are no evidence of crying spells  We had a good conversation  She knows she can talk to me at any time  She feels safe in her environment  There were no vitals filed for this visit  Physical Exam   The patient was orient x3  There is no evidence of stressed  She feels safe  She is well kept  She had appropriate conversation  I spent 10 minutes directly with the patient during this visit      VIRTUAL VISIT DISCLAIMER    Steve Faustin acknowledges that she has consented to an online visit or consultation  She understands that the online visit is based solely on information provided by her, and that, in the absence of a face-to-face physical evaluation by the physician, the diagnosis she receives is both limited and provisional in terms of accuracy and completeness  This is not intended to replace a full medical face-to-face evaluation by the physician  Steve Faustin understands and accepts these terms

## 2020-07-14 NOTE — PATIENT INSTRUCTIONS
Postpartum anxiety, now to start Zoloft 50 mg daily she started yesterday  She will call my office in a week for progress report  She was reminded any more crying spells mood swings baby blues or increased anxiety call my office as soon as possible

## 2021-01-11 ENCOUNTER — INITIAL PRENATAL (OUTPATIENT)
Dept: OBGYN CLINIC | Facility: CLINIC | Age: 26
End: 2021-01-11

## 2021-01-11 VITALS
DIASTOLIC BLOOD PRESSURE: 68 MMHG | HEIGHT: 68 IN | BODY MASS INDEX: 27.13 KG/M2 | SYSTOLIC BLOOD PRESSURE: 118 MMHG | WEIGHT: 179 LBS

## 2021-01-11 DIAGNOSIS — Z36.89 ENCOUNTER FOR OTHER SPECIFIED ANTENATAL SCREENING: ICD-10-CM

## 2021-01-11 DIAGNOSIS — Z34.81 PRENATAL CARE, SUBSEQUENT PREGNANCY, FIRST TRIMESTER: Primary | ICD-10-CM

## 2021-01-11 DIAGNOSIS — Z3A.08 8 WEEKS GESTATION OF PREGNANCY: ICD-10-CM

## 2021-01-11 PROCEDURE — OBC: Performed by: OBSTETRICS & GYNECOLOGY

## 2021-01-11 NOTE — PROGRESS NOTES
INITIAL PRENATAL NURSE APPT:   (SAVD 2020)    Unplanned pregnancy - not actively attempting - no birth control since 1st delivery  Last pregnancy was transfer from [facility] @ 31 5/7 wk  LMP 2020 - 8 4/7 wk - [facility] 2021  Prev on Zoloft postpartum x 1 month  Last seen 3/2020 for 6 wk postpartum appt  Pt's bl type/RH is A NEG - Rhogam candidate  Hx pap ASCUS, (+) HPV (beg last preg - [place]) - had colpo during preg & was recom to have repeat colpo after delivery   Hx celiac disease - follows gluten free diet  Pt did not get flu vaccine   No hx MRSA  Pt will schedule appt for dental cleaning - last cleaning approx 1 yr ago  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

## 2021-01-19 LAB
ABO GROUP BLD: NORMAL
APPEARANCE UR: CLEAR
BACTERIA UR QL AUTO: ABNORMAL /HPF
BASOPHILS # BLD AUTO: 47 CELLS/UL (ref 0–200)
BASOPHILS NFR BLD AUTO: 0.5 %
BILIRUB UR QL STRIP: NEGATIVE
BLD GP AB SCN SERPL QL: NORMAL
COLOR UR: YELLOW
EOSINOPHIL # BLD AUTO: 177 CELLS/UL (ref 15–500)
EOSINOPHIL NFR BLD AUTO: 1.9 %
ERYTHROCYTE [DISTWIDTH] IN BLOOD BY AUTOMATED COUNT: 12.2 % (ref 11–15)
GLUCOSE UR QL STRIP: NEGATIVE
HBV SURFACE AG SERPL QL IA: NORMAL
HCT VFR BLD AUTO: 38.5 % (ref 35–45)
HGB BLD-MCNC: 12.8 G/DL (ref 11.7–15.5)
HGB UR QL STRIP: NEGATIVE
HIV 1+2 AB+HIV1 P24 AG SERPL QL IA: NORMAL
HYALINE CASTS #/AREA URNS LPF: ABNORMAL /LPF
KETONES UR QL STRIP: NEGATIVE
LEUKOCYTE ESTERASE UR QL STRIP: NEGATIVE
LYMPHOCYTES # BLD AUTO: 2000 CELLS/UL (ref 850–3900)
LYMPHOCYTES NFR BLD AUTO: 21.5 %
MCH RBC QN AUTO: 32.7 PG (ref 27–33)
MCHC RBC AUTO-ENTMCNC: 33.2 G/DL (ref 32–36)
MCV RBC AUTO: 98.2 FL (ref 80–100)
MONOCYTES # BLD AUTO: 725 CELLS/UL (ref 200–950)
MONOCYTES NFR BLD AUTO: 7.8 %
NEUTROPHILS # BLD AUTO: 6352 CELLS/UL (ref 1500–7800)
NEUTROPHILS NFR BLD AUTO: 68.3 %
NITRITE UR QL STRIP: NEGATIVE
PH UR STRIP: 7.5 [PH] (ref 5–8)
PLATELET # BLD AUTO: 363 THOUSAND/UL (ref 140–400)
PMV BLD REES-ECKER: 10.8 FL (ref 7.5–12.5)
PROT UR QL STRIP: NEGATIVE
RBC # BLD AUTO: 3.92 MILLION/UL (ref 3.8–5.1)
RBC #/AREA URNS HPF: ABNORMAL /HPF
RH BLD: NORMAL
RPR SER QL: NORMAL
RUBV IGG SERPL IA-ACNC: 1.91 INDEX
SP GR UR STRIP: 1.02 (ref 1–1.03)
SQUAMOUS #/AREA URNS HPF: ABNORMAL /HPF
WBC # BLD AUTO: 9.3 THOUSAND/UL (ref 3.8–10.8)
WBC #/AREA URNS HPF: ABNORMAL /HPF

## 2021-01-25 ENCOUNTER — ROUTINE PRENATAL (OUTPATIENT)
Dept: OBGYN CLINIC | Facility: CLINIC | Age: 26
End: 2021-01-25

## 2021-01-25 VITALS — DIASTOLIC BLOOD PRESSURE: 66 MMHG | WEIGHT: 180.2 LBS | SYSTOLIC BLOOD PRESSURE: 104 MMHG | BODY MASS INDEX: 27.81 KG/M2

## 2021-01-25 DIAGNOSIS — Z34.81 PRENATAL CARE, SUBSEQUENT PREGNANCY, FIRST TRIMESTER: Primary | ICD-10-CM

## 2021-01-25 DIAGNOSIS — Z36.89 ENCOUNTER FOR OTHER SPECIFIED ANTENATAL SCREENING: ICD-10-CM

## 2021-01-25 PROCEDURE — PNV: Performed by: OBSTETRICS & GYNECOLOGY

## 2021-01-25 NOTE — PROGRESS NOTES
Viable intrauterine pregnancy, size equals dates, low risk pregnancy, to make a consultation Maternal-Fetal Medicine, Rh negative    Return to office in 4 weeks

## 2021-01-25 NOTE — PATIENT INSTRUCTIONS
Viable IUP, size equals dates, to make arrangements for consultation Maternal-Fetal Medicine to get sequential screening test   Return to office in 4 weeks Rh negative

## 2021-01-29 LAB
C TRACH RRNA SPEC QL NAA+PROBE: NOT DETECTED
CLINICAL INFO: NORMAL
CYTO CVX: NORMAL
CYTOLOGY CMNT CVX/VAG CYTO-IMP: NORMAL
DATE PREVIOUS BX: NORMAL
LMP START DATE: NORMAL
N GONORRHOEA RRNA SPEC QL NAA+PROBE: NOT DETECTED
SL AMB PREV. PAP:: NORMAL
SPECIMEN SOURCE CVX/VAG CYTO: NORMAL

## 2021-02-01 ENCOUNTER — TELEMEDICINE (OUTPATIENT)
Dept: PERINATAL CARE | Facility: CLINIC | Age: 26
End: 2021-02-01

## 2021-02-01 DIAGNOSIS — O35.2XX0 HEREDITARY DISEASE IN FAMILY POSSIBLY AFFECTING FETUS, AFFECTING MANAGEMENT OF MOTHER IN PREGNANCY, SINGLE OR UNSPECIFIED FETUS: Primary | ICD-10-CM

## 2021-02-01 DIAGNOSIS — Z13.71 TESTING OF FEMALE FOR GENETIC DISEASE CARRIER STATUS: ICD-10-CM

## 2021-02-01 DIAGNOSIS — Z31.5 ENCOUNTER FOR PROCREATIVE GENETIC COUNSELING: ICD-10-CM

## 2021-02-01 PROCEDURE — NC001 PR NO CHARGE

## 2021-02-04 NOTE — PROGRESS NOTES
Genetic Counseling   High-Risk Gestation Note    Appointment Date:  2021  Referred By: Montse Saldana MD  YOB: 1995  Partner:  Vikash Pearsonar  Indication for Visit:  ethnicity and prenatal testing and screening options  Pregnancy History:   Estimated Date of Delivery: 21  Estimated Gestational Age: 11w4d    Virtual Regular Visit      Assessment/Plan:    Problem List Items Addressed This Visit     None      Visit Diagnoses     Hereditary disease in family possibly affecting fetus, affecting management of mother in pregnancy, single or unspecified fetus    -  Primary    Testing of female for genetic disease carrier status        Encounter for procreative genetic counseling                   Reason for visit is   Chief Complaint   Patient presents with    Virtual Regular Visit        Encounter provider Michelle Mills    Provider located at [address] [place] [id number] [phone]      Recent Visits  No visits were found meeting these conditions  Showing recent visits within past 7 days and meeting all other requirements     Future Appointments  No visits were found meeting these conditions  Showing future appointments within next 150 days and meeting all other requirements        The patient was identified by name and date of birth  Genaro Haynes was informed that this is a telemedicine visit and that the visit is being conducted through [employer] and patient was informed that this is a secure, HIPAA-compliant platform  She agrees to proceed     My office door was closed  No one else was in the room  She acknowledged consent and understanding of privacy and security of the video platform  The patient has agreed to participate and understands they can discontinue the visit at any time  Patient is aware this is a billable service       Concepción Hunter is a 31 y o  female who presented for genetic counseling to discuss the available prenatal testing and screening options  We reviewed that the risk of Down syndrome at age 31 at delivery is 1/935, and the risk for any chromosomal abnormality at this age is 1/417  The differences between full chromosome aneuploidies and copy number variants (microdeletions and microduplications) was also discussed  We reviewed that copy number variants occur in about 0 4% of all pregnancies  Therefore, for patients under age 39 the risk for copy number variants is higher than the risk for Down syndrome  The risks, benefits, and limitations of amniocentesis were discussed with the patient  Amniocentesis is performed under direct real time ultrasound visualization to avoid both the fetus and the placenta  Once amniotic fluid is withdrawn, laboratory analysis is performed and amniotic fluid alpha-fetoprotein, as well as chromosome and/or microarray analysis is undertaken  The risk of genetic amniocentesis includes, but is not limited to less than 1 in 300 pregnancy loss rate or  delivery rate if 23 weeks or greater, infection, bleeding, rupture of membranes, failure of cells to grow, karyotype error, laboratory error, etc   Occasionally a repeat amniocentesis is necessary due to cell culture failure  Chromosome/microarray analysis from amniocentesis is 99 9% accurate and alpha-fetoprotein analysis can detect approximately 95% of open neural tube defects  Chorionic villus sampling (CVS) is another diagnostic testing option that is available earlier than amniocentesis, between 10-14 weeks gestation  Like amniocentesis, CVS is 99% accurate for detecting chromosomal problems  Unlike amniocentesis, CVS cannot detect alpha-fetoprotein levels in order to determine the risk for open neural tube defects  MSAFP testing would need to be performed at 15-20 weeks gestation for this purpose    The risk of CVS includes, but is not limited to, less than a 1 in 300 risk for pregnancy loss  There is also a 1% risk for maternal cell contamination and cell culture failure, in which case the CVS would need to be followed-up with amniocentesis  We reviewed the testing option of cell free fetal DNA screening (also known as noninvasive prenatal testing or NIPT)  We discussed that it is a serum test to identify fragments of fetal DNA in maternal blood  We reviewed the benefits and limitations of cell free fetal DNA screening in detecting Down syndrome, Trisomy 13, Trisomy 25 and sex chromosome aneuploidies  We also discussed that cell free fetal DNA screening does not detect additional chromosomal abnormalities and the possibility of a failed test result  As cell free fetal DNA screening does not detect open neural tube defects, MSAFP screening is available at 15-20 weeks gestation  Sequential screening consists of first trimester measurement of nuchal translucency combined with first trimester biochemical analysis, as well as second trimester biochemical analysis  It is able to detect approximately 95% of pregnancies in which the fetus has Down syndrome, 90% of pregnancies in which the fetus has trisomy 25 and 80% of pregnancies which the fetus has an open neural tube defect  It can also indirectly identify other chromosomal abnormalities, copy number variants, genetic syndromes, or adverse pregnancy outcomes if serum analyte levels are abnormal     We discussed the availability of an ultrasound between 11-14 weeks gestation to measure the nuchal translucency (NT), which can assess for chromosome abnormalities, cardiac defects, and other adverse pregnancy outcomes  We reviewed that level II anatomy ultrasound is typically performed at approximately 20 weeks gestation  Level II ultrasound evaluation is between 60-80% accurate in detecting major physical birth defects and variations in fetal development that may be associated with chromosome abnormalities    Level II ultrasound evaluation is not able to detect all birth defects or health problems  After discussing the available prenatal testing and screening options this patient was undecided on which serum screening test should would like to pursue  She will further discuss it with her partner and will make a final decision at the time of her NT ultrasound  We reviewed that the results of NIPT will disclose the fetal sex and will be available in her MyChart to review  The QNatal cost estimate information was also provided to the patient and she was encouraged to find out how much it would be prior to getting her blood drawn if she chooses the [place]  We reviewed that some insurance policies do not cover NIPT unless the patient meets certain high risk criteria  We also discussed that if she is not able to obtain a cost estimate she may require prior authorization and was instructed to contact our office  The patient declined CVS and amniocentesis secondary to procedural related complications  She may reconsider diagnostic testing should serum screening come back abnormal   Theatrell Rivera is also planning on pursuing NT ultrasound, MSAFP screening (if NIPT performed) and Level II ultrasound at the appropriate times  Histories for the patient and her partner's family were taken during our session  The patient states that her son was born with a lymphatic malformation on his neck and hypospadias  He had surgery at [facility] and there was no suspicion of an underlying genetic disorder or syndrome  We discussed that based on this history there may be an increased risk in the current pregnancy for a similar finding  Further review of family history for the patient and her partner was noncontributory  The family history was not significant for other genetic diseases or disorders, intellectual disability, birth defects, fetal loss, or consanguinity      Patient reports being of Serbian/Dionne decent and that her partner is of Japanese/English/Sephardic Holiness decent  The patient had cystic fibrosis carrier screening in a previous pregnancy and was negative, though these results were not found in her chart  The benefits and limitations of Spinal muscular atrophy (SMA), hemoglobinopathy, Fragile X, and expanded carrier screening was discussed  The patient was unsure of pursuing expanded carrier screening, but elected to pursue SMA, hemoglobinopathy, and Fragile X carrier screening pending insurance approval   She will be contacted for her blood draw once approval is obtained  Lastly, we discussed the fact that everyone in the general population regardless of age, family history, or medical background has approximately a 3-5% risk of having a child with some type of congenital anomaly, genetic disease or intellectual disability  Currently there are no tests available to rule out all birth defects or health problems  Corinnekirti Humphrey was provided with our contact information  I encouraged her to call with any questions or concerns  Plan/Tests Ordered:  1) Patient declined CVS and amniocentesis  2) Patient unsure of pursuing Sequential screen or NIPT - will decide at NT ultrasound  3) Patient elected SMA, Fragile X carrier screening and hemoglobin electrophoresis pending insurance approval   4) NT ultrasound scheduled for 2/9/21  5) MSAFP screening at 15-20 weeks gestation if NIPT performed  6) Level II anatomy ultrasound at approximately 20 weeks gestation            HPI     Past Medical History:   Diagnosis Date    Abnormal Pap smear of cervix     ASCUS, (+) HPV - HAD COLPO DURING PREG - NO BIOPSIES - F/U AFTER DEL    Celiac disease     HPV (human papilloma virus) infection     Migraine     beg of pregnancy related to celiac - during preg    Rh incompatibility     had Rhogam @ 14 wk (bleeding) & 28 wk       Past Surgical History:   Procedure Laterality Date    AUGMENTATION BREAST      2016       Current Outpatient Medications Medication Sig Dispense Refill    acetaminophen (TYLENOL) 325 mg tablet Take 2 tablets (650 mg total) by mouth every 4 (four) hours as needed for mild pain, moderate pain, severe pain or fever (Patient not taking: Reported on 2/17/2020) 30 tablet 0    Docosahexaenoic Acid (DHA COMPLETE PO) Take by mouth daily      docusate sodium (COLACE) 100 mg capsule Take 1 capsule (100 mg total) by mouth 2 (two) times a day (Patient not taking: Reported on 2/17/2020) 10 capsule 0    ibuprofen (MOTRIN) 600 mg tablet Take 1 tablet (600 mg total) by mouth every 6 (six) hours as needed (cramping) (Patient not taking: Reported on 2/17/2020) 30 tablet 0    Prenatal Vit-Fe Fumarate-FA (PRENATAL VITAMIN PO) Take 2 tablets by mouth daily      sertraline (ZOLOFT) 50 mg tablet Take 1 tablet (50 mg total) by mouth daily (Patient not taking: Reported on 1/11/2021) 30 tablet 2     No current facility-administered medications for this visit  No Known Allergies    Review of Systems    Video Exam    There were no vitals filed for this visit  Physical Exam     I spent 60 minutes directly with the patient during this visit      VIRTUAL VISIT DISCLAIMER    Adrienne Watters acknowledges that she has consented to an online visit or consultation  She understands that the online visit is based solely on information provided by her, and that, in the absence of a face-to-face physical evaluation by the physician, the diagnosis she receives is both limited and provisional in terms of accuracy and completeness  This is not intended to replace a full medical face-to-face evaluation by the physician  Adrienne Watters understands and accepts these terms

## 2021-02-08 ENCOUNTER — TELEPHONE (OUTPATIENT)
Dept: PERINATAL CARE | Facility: OTHER | Age: 26
End: 2021-02-08

## 2021-02-08 PROBLEM — Z82.79 FAMILY HISTORY OF CONGENITAL ANOMALY: Status: ACTIVE | Noted: 2021-02-08

## 2021-02-08 NOTE — TELEPHONE ENCOUNTER
-------------------------------------------------------------    Attempted to reach patient by phone and left voicemail to confirm appointment for MFM ultrasound  1 support person ( must be over the age of 15) may accompany you for your appointment  If you or your support person have traveled outside the state in the past 2 weeks, please call and notify our office today #[phone]  You and your support person must wear a mask ,covering nose and mouth,during your entire visit  To minimize your exposure in our waiting room, please call our office prior to entering the building  Check in and rooming questions will be done via phone  We will give you directions when to enter for your appointment  [place]: [phone]    IF you are not feeling well- cough, fever, shortness of breath or any flu like symptoms, contact your primary care physician or [phone]  If you are awaiting COVID 19 test results please call and reschedule your appointment    Any questions with these instructions please call Maternal Fetal Medicine nurse line today @ # [phone]

## 2021-02-09 ENCOUNTER — ROUTINE PRENATAL (OUTPATIENT)
Dept: PERINATAL CARE | Facility: OTHER | Age: 26
End: 2021-02-09
Payer: COMMERCIAL

## 2021-02-09 VITALS
HEART RATE: 77 BPM | HEIGHT: 68 IN | DIASTOLIC BLOOD PRESSURE: 79 MMHG | WEIGHT: 180 LBS | BODY MASS INDEX: 27.28 KG/M2 | SYSTOLIC BLOOD PRESSURE: 115 MMHG

## 2021-02-09 DIAGNOSIS — Z82.79 FAMILY HISTORY OF CONGENITAL ANOMALY: Primary | ICD-10-CM

## 2021-02-09 DIAGNOSIS — Z3A.12 12 WEEKS GESTATION OF PREGNANCY: ICD-10-CM

## 2021-02-09 DIAGNOSIS — Z36.82 NUCHAL TRANSLUCENCY OF FETUS ON PRENATAL ULTRASOUND: ICD-10-CM

## 2021-02-09 PROCEDURE — 99241 PR OFFICE CONSULTATION NEW/ESTAB PATIENT 15 MIN: CPT | Performed by: OBSTETRICS & GYNECOLOGY

## 2021-02-09 PROCEDURE — 76813 OB US NUCHAL MEAS 1 GEST: CPT | Performed by: OBSTETRICS & GYNECOLOGY

## 2021-02-09 PROCEDURE — 76801 OB US < 14 WKS SINGLE FETUS: CPT | Performed by: OBSTETRICS & GYNECOLOGY

## 2021-02-09 NOTE — PROGRESS NOTES
Patient chose to have Stepwise Part 1 Screening from [facility]  Instructed patient blood work must be collected no later than  Reviewed [facility] online appointment scheduling availability  Part 1 results will be available in approximately 7-10 business days  Maternal-Fetal Medicine will call patient with results or she can view in her Rony [facility]  Lab requisition will be mailed for Part 2 screening, ideal time for Part 2 collection is between 16-18 weeks gestation  Patient verbalized understanding of all instructions

## 2021-02-09 NOTE — LETTER
2021     Jaylyn Ferrara MD  [address]  [address] [place] [address]    Patient: Tamar Mathew   YOB: 1995   Date of Visit: 2021       Dear Dr Cortez Malloy: Thank you for referring Tamar Mathew to me for evaluation  Below are my notes for this consultation  If you have questions, please do not hesitate to call me  I look forward to following your patient along with you  Sincerely,        Fe Taveras MD        CC: No Recipients  Fe Taveras MD  2021 11:42 PM  Sign when Signing Visit  OFFICE CONSULT  Referring physician:   Jaylyn Ferrara Md  [address]  [address],  [address] [facility]      Dear Dr Cortez Malloy      Thank you for requesting a  consultation on your patient Ms Tamar Mathew for the following indications:  Genetic screening    History   Tanner Jered is on prenatal vitamins and has no allergies  She  Has history of celiac disease and history of migraines and her surgical history includes breast augmentation  Her OB history includes a 38 and half week vaginal delivery of a baby boy that weighed 7 pounds 12 ounces on 20  In that pregnancy her baby was followed for marginal cord on the placenta  After delivery her son was diagnosed with a lymphatic malformation on the left side of his neck and hypospadia S  He underwent surgery at [facility] at 9months of age and currently is not having any issues  She reports genetic counseling at [facility] did not feel thatshe had a recurrence risk for this to happen again in any future pregnancy  She denies any other family history  She denies any use of cigarettes, alcohol or illicit drugs  Ultrasound findings:  Her ultrasound today shows a fetus that is growing concordant with her dates  The nasal bone and nuchal translucency appear normal   No malformations are detected on today's early ultrasound       The patient was informed of the findings and counseled about the limitations of the exam in detecting all forms of fetal congenital abnormalities  She does not report any vaginal bleeding or uterine cramping or contractions  Follow up recommended:   Tests ordered today:   Sequential screen  She had genetic counseling completed through the  Genetic counselor on 2/1/21  Future tests recommended:  1  The results will return in 7-10 days for part 1 which screens for Down syndrome and trisomy 18  Part 2 will then be ordered through our office to be completed between 16-18 weeks for further screening of Down syndrome and trisomy 18 and additional screening for spina bifida  Future ultrasounds ordered today: Fetal Level II ultrasound imaging is recommended at 19-20 weeks' gestation  The majority of time (greater then 50%) was spent on counseling and coordination of care of this patient and /or family member  Approximate face to face time was 15 minutes              Neda Delatorre MD

## 2021-02-09 NOTE — PROGRESS NOTES
OFFICE CONSULT  Referring physician:   Ambreen Del Castillo Md  [address]  [address],  [address] [facility]      Dear Dr Elly Matthew      Thank you for requesting a  consultation on your patient Ms Inder Puga for the following indications:  Genetic screening    History   Dickson Dutta is on prenatal vitamins and has no allergies  She  Has history of celiac disease and history of migraines and her surgical history includes breast augmentation  Her OB history includes a 38 and half week vaginal delivery of a baby boy that weighed 7 pounds 12 ounces on 20  In that pregnancy her baby was followed for marginal cord on the placenta  After delivery her son was diagnosed with a lymphatic malformation on the left side of his neck and hypospadia S  He underwent surgery at [facility] at 9months of age and currently is not having any issues  She reports genetic counseling at [facility] did not feel thatisak had a recurrence risk for this to happen again in any future pregnancy  She denies any other family history  She denies any use of cigarettes, alcohol or illicit drugs  Ultrasound findings:  Her ultrasound today shows a fetus that is growing concordant with her dates  The nasal bone and nuchal translucency appear normal   No malformations are detected on today's early ultrasound  The patient was informed of the findings and counseled about the limitations of the exam in detecting all forms of fetal congenital abnormalities  She does not report any vaginal bleeding or uterine cramping or contractions  Follow up recommended:   Tests ordered today:   Sequential screen  She had genetic counseling completed through the  Genetic counselor on 21  Future tests recommended:  1  The results will return in 7-10 days for part 1 which screens for Down syndrome and trisomy 18   Part 2 will then be ordered through our office to be completed between 16-18 weeks for further screening of Down syndrome and trisomy 25 and additional screening for spina bifida  Future ultrasounds ordered today: Fetal Level II ultrasound imaging is recommended at 19-20 weeks' gestation  The majority of time (greater then 50%) was spent on counseling and coordination of care of this patient and /or family member  Approximate face to face time was 15 minutes              Darryle Cornish, MD

## 2021-02-10 ENCOUNTER — DOCUMENTATION (OUTPATIENT)
Dept: PERINATAL CARE | Facility: CLINIC | Age: 26
End: 2021-02-10

## 2021-02-10 ENCOUNTER — TELEPHONE (OUTPATIENT)
Dept: PERINATAL CARE | Facility: CLINIC | Age: 26
End: 2021-02-10

## 2021-02-10 NOTE — TELEPHONE ENCOUNTER
Pt  Called regarding being told it would cost her $200-$ 800 for this test and needed prior auth, this is STEP WISE 1 test        I spoke with Michael Sellers who told me Pt 's authorization was pending  I called pt  back and she told me she actually had the specimen drawn yesterday  I recommended that she waited until we heard back about the prior auth and/ or she got a bill from [employer] if any and she could work on it from that point  She verbalized understandings

## 2021-02-10 NOTE — PROGRESS NOTES
Requested auth for [id number] and [id number] through [employer] for [facility], pending auth # [id number]

## 2021-02-13 LAB
# FETUSES US: 1
AGE: NORMAL
B-HCG ADJ MOM SERPL: 1.57
B-HCG SERPL-ACNC: 115.5 IU/ML
COLLECT DATE: NORMAL
CURRENT SMOKER: NO
DONATED EGG PATIENT QL: NO
FET CRL US.MEAS: 65 MM
FET CRL US.MEAS: NORMAL MM
FET NUCHAL FOLD MOM THICKNESS US.MEAS: 1.24
FET NUCHAL FOLD THICKNESS US.MEAS: 1.9 MM
FET NUCHAL FOLD THICKNESS US.MEAS: NORMAL MM
FET TS 21 RISK FROM MAT AGE: NORMAL
GA CLIN EST: 12.9 WK
GA METHOD: NORMAL
HX OF NTD NARR: NO
HX OF TRISOMY 21 NARR: NO
IDDM PATIENT QL: NO
INTEGRATED SCN PATIENT-IMP: NORMAL
PAPP-A MOM SERPL: 0.78
PAPP-A SERPL-MCNC: 688.5 NG/ML
PHYSICIAN NPI: NORMAL
SL AMB NASAL BONE: PRESENT
SL AMB NTQR LOCATION ID#: NORMAL
SL AMB NTQR READING PHYS ID#: NORMAL
SL AMB REFERRING PHYSICIAN NAME: NORMAL
SL AMB REFERRING PHYSICIAN PHONE: NORMAL
SL AMB REPEAT SPECIMEN: NO
SL AMB TWIN B NASAL BONE: NORMAL
SONOGRAPHER NAME: NORMAL
SONOGRAPHER: NORMAL
SONOGRAPHER: NORMAL
TS 18 RISK FETUS: NORMAL
TS 21 RISK FETUS: NORMAL
US DATE: NORMAL
US FETUSES STUDY [IMP]: NORMAL

## 2021-02-16 ENCOUNTER — TELEPHONE (OUTPATIENT)
Dept: PERINATAL CARE | Facility: OTHER | Age: 26
End: 2021-02-16

## 2021-02-16 NOTE — TELEPHONE ENCOUNTER
----- Message from Niranjan Duarte MD sent at 2/15/2021  9:40 PM EST -----  Patient updated with her lab results through my chart

## 2021-02-16 NOTE — TELEPHONE ENCOUNTER
Spoke with Sommer Gomez via number on file in communication consent regarding stepwise part 1 wnl, Instructed on completion of part 2 during optimal dates when she receives TRF mailed out today

## 2021-02-16 NOTE — LETTER
02/16/21  [facility]   1995    Thank you for completing Part 1 of your Sequential Screen  To obtain a complete test result, please complete blood work for Part 2 Sequential Screen between the weeks of 2/25 to 3/10  Based on your insurance coverage, please use one of the following locations  The other option is to go to [email or web address]  Call our office for any questions at [phone]          Sincerely,    Dee Earl RN

## 2021-02-22 ENCOUNTER — TELEPHONE (OUTPATIENT)
Dept: PERINATAL CARE | Facility: CLINIC | Age: 26
End: 2021-02-22

## 2021-02-22 NOTE — TELEPHONE ENCOUNTER
Received notice from patient's insurance that Fragile X carrier screening was denied however SMA carrier screening was approved (Auth # [id number])  Called patient and confirmed date of birth  Let her know prior Calixto Zacarias was denied for Fragile X but obtained for SMA  Reminded patient that approval is not a guarantee of payment and she may have some out of pocket cost depending on her deductible and any co-insurance  Due to possible cost patient declined carrier screening at this time  She was informed that it can be done later should she choose  Patient expressed verbal understanding and had no questions

## 2021-02-23 ENCOUNTER — ROUTINE PRENATAL (OUTPATIENT)
Dept: OBGYN CLINIC | Facility: CLINIC | Age: 26
End: 2021-02-23

## 2021-02-23 VITALS — SYSTOLIC BLOOD PRESSURE: 120 MMHG | BODY MASS INDEX: 28.21 KG/M2 | WEIGHT: 182.8 LBS | DIASTOLIC BLOOD PRESSURE: 74 MMHG

## 2021-02-23 DIAGNOSIS — Z34.82 PRENATAL CARE, SUBSEQUENT PREGNANCY, SECOND TRIMESTER: Primary | ICD-10-CM

## 2021-02-23 PROCEDURE — PNV: Performed by: OBSTETRICS & GYNECOLOGY

## 2021-02-23 NOTE — PATIENT INSTRUCTIONS
The patient will make arrangements to complete the sequential screening test within the next week or 2  Return to office in 4 weeks

## 2021-03-06 LAB
# FETUSES US: 1
AFP ADJ MOM SERPL: 0.54
AFP SERPL-MCNC: 15.9 NG/ML
B-HCG ADJ MOM SERPL: 1.81
B-HCG SERPL-ACNC: 63.6 IU/ML
COLLECT DATE: NORMAL
CURRENT SMOKER: NO
FET CRL US.MEAS: 65 MM
FET NUCHAL FOLD MOM THICKNESS US.MEAS: 1.24
FET NUCHAL FOLD THICKNESS US.MEAS: 1.9 MM
FET TS 21 RISK FROM MAT AGE: NORMAL
GA CLIN EST: 15.9 WK
HX OF NTD NARR: NORMAL
IDDM PATIENT QL: NORMAL
INHIBIN A ADJ MOM SERPL: 0.92
INHIBIN A SERPL-MCNC: 141 PG/ML
INTEGRATED SCN PATIENT-IMP: NORMAL
NEURAL TUBE DEFECT RISK FETUS: NORMAL %
PAPP-A MOM SERPL: 0.78
PAPP-A SERPL-MCNC: 688.5 NG/ML
PHYSICIAN NPI: NORMAL
SL AMB NASAL BONE: PRESENT
SL AMB REFERRING PHYSICIAN NAME: NORMAL
SL AMB REFERRING PHYSICIAN PHONE: NORMAL
SL AMB REPEAT SPECIMEN: NORMAL
SL AMB SPECIMEN # FROM PART 1: NORMAL
SL AMB TWIN B NASAL BONE: NORMAL
TS 18 RISK FETUS: NORMAL
TS 21 RISK FETUS: NORMAL
U ESTRIOL ADJ MOM SERPL: 1.5
U ESTRIOL SERPL-MCNC: 0.99 NG/ML
US DATE: NORMAL

## 2021-03-29 ENCOUNTER — ROUTINE PRENATAL (OUTPATIENT)
Dept: OBGYN CLINIC | Facility: CLINIC | Age: 26
End: 2021-03-29

## 2021-03-29 VITALS
HEIGHT: 68 IN | WEIGHT: 187.8 LBS | BODY MASS INDEX: 28.46 KG/M2 | DIASTOLIC BLOOD PRESSURE: 60 MMHG | SYSTOLIC BLOOD PRESSURE: 110 MMHG

## 2021-03-29 DIAGNOSIS — Z34.82 PRENATAL CARE, SUBSEQUENT PREGNANCY, SECOND TRIMESTER: Primary | ICD-10-CM

## 2021-03-29 PROCEDURE — PNV: Performed by: NURSE PRACTITIONER

## 2021-03-29 NOTE — PROGRESS NOTES
Inderray Puga is doing well  Denies LOF/Bleeding/Cramping  Level 2 scheduled for 4/1/21  RTO in 4 weeks

## 2021-04-01 ENCOUNTER — ROUTINE PRENATAL (OUTPATIENT)
Dept: PERINATAL CARE | Facility: OTHER | Age: 26
End: 2021-04-01
Payer: COMMERCIAL

## 2021-04-01 VITALS
HEART RATE: 90 BPM | BODY MASS INDEX: 28.43 KG/M2 | WEIGHT: 187.6 LBS | DIASTOLIC BLOOD PRESSURE: 78 MMHG | SYSTOLIC BLOOD PRESSURE: 123 MMHG | HEIGHT: 68 IN

## 2021-04-01 DIAGNOSIS — Z36.86 ENCOUNTER FOR ANTENATAL SCREENING FOR CERVICAL LENGTH: ICD-10-CM

## 2021-04-01 DIAGNOSIS — Z3A.20 20 WEEKS GESTATION OF PREGNANCY: ICD-10-CM

## 2021-04-01 DIAGNOSIS — Z82.79 FAMILY HISTORY OF CONGENITAL ANOMALY: Primary | ICD-10-CM

## 2021-04-01 PROCEDURE — 76817 TRANSVAGINAL US OBSTETRIC: CPT | Performed by: OBSTETRICS & GYNECOLOGY

## 2021-04-01 PROCEDURE — 76811 OB US DETAILED SNGL FETUS: CPT | Performed by: OBSTETRICS & GYNECOLOGY

## 2021-04-01 PROCEDURE — 99213 OFFICE O/P EST LOW 20 MIN: CPT | Performed by: OBSTETRICS & GYNECOLOGY

## 2021-04-01 NOTE — LETTER
April 1, 2021     Ni Douglas, [address] [facility]  [address] [address]    Patient: Vishal Carrasco   YOB: 1995   Date of Visit: 4/1/2021       Dear Dr Dorena Osgood: Thank you for referring Vishal Carrasco to me for evaluation  Below are my notes for this consultation  If you have questions, please do not hesitate to call me  I look forward to following your patient along with you  Sincerely,        Abel Luis MD        CC: No Recipients  Abel Luis MD  4/1/2021  8:46 PM  Sign when Signing Visit  Vishal Carrasco  has no complaints today 20w0d  She reports fetal movements and does not report any vaginal bleeding or signs of labor  Her recently completed fetal testing revealed a normal sequential screen  Problem list:  1  Her prior baby had a lymphatic malformation found after birth on the right side of his neck ( not left-sided like I mentioned in my prior note) and also had hypospadias  Ultrasound findings: The ultrasound today shows normal interval fetal growth and fluid, normal cervical length, and no malformations were detected  Today's anatomical survey was limited secondary to fetal position  Pregnancy ultrasound has limitations and is unable to detect all forms of fetal congenital abnormalities  Specific counseling was provided on the following problems: We discussed the risks and benefits of receiving the COVID vaccination in pregnancy  Follow up recommended:   1  Recommend a follow-up ultrasound at 32 weeks for growth and missed anatomy and to review the fetal neck  Pre visit time reviewing her records   0 minutes  Face to face time 5 minutes  Post visit time on documentation of note, updating her problem list, adding orders and prescriptions 5 minutes  Procedures that were completed today were charged separately  The level of decision making was straight forward      Abel Luis MD

## 2021-04-01 NOTE — PROGRESS NOTES
Ultrasound Probe Disinfection    A transvaginal ultrasound was performed  Prior to use, disinfection was performed with High Level Disinfection Process (Trophon)  Probe serial number [id number]: [id number] was used        Usman Nieto  04/01/21  1:47 PM

## 2021-04-01 NOTE — PROGRESS NOTES
Dana Birch  has no complaints today 20w0d  She reports fetal movements and does not report any vaginal bleeding or signs of labor  Her recently completed fetal testing revealed a normal sequential screen  Problem list:  1  Her prior baby had a lymphatic malformation found after birth on the right side of his neck ( not left-sided like I mentioned in my prior note) and also had hypospadias  Ultrasound findings: The ultrasound today shows normal interval fetal growth and fluid, normal cervical length, and no malformations were detected  Today's anatomical survey was limited secondary to fetal position  Pregnancy ultrasound has limitations and is unable to detect all forms of fetal congenital abnormalities  Specific counseling was provided on the following problems: We discussed the risks and benefits of receiving the COVID vaccination in pregnancy  Follow up recommended:   1  Recommend a follow-up ultrasound at 32 weeks for growth and missed anatomy and to review the fetal neck  Pre visit time reviewing her records   0 minutes  Face to face time 5 minutes  Post visit time on documentation of note, updating her problem list, adding orders and prescriptions 5 minutes  Procedures that were completed today were charged separately  The level of decision making was straight forward      Alysisa Alejo MD

## 2021-04-27 ENCOUNTER — ROUTINE PRENATAL (OUTPATIENT)
Dept: OBGYN CLINIC | Facility: CLINIC | Age: 26
End: 2021-04-27

## 2021-04-27 VITALS — BODY MASS INDEX: 29.69 KG/M2 | SYSTOLIC BLOOD PRESSURE: 110 MMHG | DIASTOLIC BLOOD PRESSURE: 70 MMHG | WEIGHT: 192.4 LBS

## 2021-04-27 DIAGNOSIS — Z34.82 PRENATAL CARE, SUBSEQUENT PREGNANCY, SECOND TRIMESTER: Primary | ICD-10-CM

## 2021-04-27 PROCEDURE — PNV: Performed by: OBSTETRICS & GYNECOLOGY

## 2021-05-25 ENCOUNTER — ROUTINE PRENATAL (OUTPATIENT)
Dept: OBGYN CLINIC | Facility: CLINIC | Age: 26
End: 2021-05-25

## 2021-05-25 VITALS — SYSTOLIC BLOOD PRESSURE: 110 MMHG | WEIGHT: 199 LBS | BODY MASS INDEX: 30.71 KG/M2 | DIASTOLIC BLOOD PRESSURE: 78 MMHG

## 2021-05-25 DIAGNOSIS — Z36.9 ENCOUNTER FOR ANTENATAL SCREENING: ICD-10-CM

## 2021-05-25 DIAGNOSIS — Z34.82 PRENATAL CARE, SUBSEQUENT PREGNANCY, SECOND TRIMESTER: Primary | ICD-10-CM

## 2021-05-25 PROCEDURE — PNV: Performed by: NURSE PRACTITIONER

## 2021-05-25 NOTE — PROGRESS NOTES
Steve Faustin is doing well  She was experiencing pain in her tailbone and pelvis, but that has now resolved  Denies LOF/Bleeding/Cramping  +FM    She has a follow-up US at 32 weeks for growth and missed anatomy on   RTO within the next week for Rhogam administration  RTO in 3 weeks for routine prenatal care  Roni Barber was seen today for routine prenatal visit  Diagnoses and all orders for this visit:    Prenatal care, subsequent pregnancy, second trimester    Encounter for  screening  -     Glucose, 1H PG; Future  -     RPR;  Future  -     Glucose, 1H PG; Future

## 2021-05-28 ENCOUNTER — ROUTINE PRENATAL (OUTPATIENT)
Dept: OBGYN CLINIC | Facility: CLINIC | Age: 26
End: 2021-05-28
Payer: COMMERCIAL

## 2021-05-28 DIAGNOSIS — O26.899 RH NEGATIVE STATE IN ANTEPARTUM PERIOD: Primary | ICD-10-CM

## 2021-05-28 DIAGNOSIS — Z67.91 RH NEGATIVE STATE IN ANTEPARTUM PERIOD: Primary | ICD-10-CM

## 2021-05-28 DIAGNOSIS — Z36.89 ENCOUNTER FOR OTHER SPECIFIED ANTENATAL SCREENING: ICD-10-CM

## 2021-05-28 PROCEDURE — 96372 THER/PROPH/DIAG INJ SC/IM: CPT | Performed by: OBSTETRICS & GYNECOLOGY

## 2021-06-03 LAB
BASOPHILS # BLD AUTO: 44 CELLS/UL (ref 0–200)
BASOPHILS NFR BLD AUTO: 0.5 %
EOSINOPHIL # BLD AUTO: 165 CELLS/UL (ref 15–500)
EOSINOPHIL NFR BLD AUTO: 1.9 %
ERYTHROCYTE [DISTWIDTH] IN BLOOD BY AUTOMATED COUNT: 12.3 % (ref 11–15)
GLUCOSE 1H P 50 G GLC PO SERPL-MCNC: 108 MG/DL
HCT VFR BLD AUTO: 37.9 % (ref 35–45)
HGB BLD-MCNC: 12.5 G/DL (ref 11.7–15.5)
LYMPHOCYTES # BLD AUTO: 1583 CELLS/UL (ref 850–3900)
LYMPHOCYTES NFR BLD AUTO: 18.2 %
MCH RBC QN AUTO: 33.1 PG (ref 27–33)
MCHC RBC AUTO-ENTMCNC: 33 G/DL (ref 32–36)
MCV RBC AUTO: 100.3 FL (ref 80–100)
MONOCYTES # BLD AUTO: 626 CELLS/UL (ref 200–950)
MONOCYTES NFR BLD AUTO: 7.2 %
NEUTROPHILS # BLD AUTO: 6281 CELLS/UL (ref 1500–7800)
NEUTROPHILS NFR BLD AUTO: 72.2 %
PLATELET # BLD AUTO: 326 THOUSAND/UL (ref 140–400)
PMV BLD REES-ECKER: 10.3 FL (ref 7.5–12.5)
RBC # BLD AUTO: 3.78 MILLION/UL (ref 3.8–5.1)
RPR SER QL: NORMAL
WBC # BLD AUTO: 8.7 THOUSAND/UL (ref 3.8–10.8)

## 2021-06-18 ENCOUNTER — ROUTINE PRENATAL (OUTPATIENT)
Dept: OBGYN CLINIC | Facility: CLINIC | Age: 26
End: 2021-06-18
Payer: COMMERCIAL

## 2021-06-18 VITALS — WEIGHT: 202.8 LBS | DIASTOLIC BLOOD PRESSURE: 60 MMHG | BODY MASS INDEX: 31.29 KG/M2 | SYSTOLIC BLOOD PRESSURE: 100 MMHG

## 2021-06-18 DIAGNOSIS — Z23 NEED FOR TDAP VACCINATION: ICD-10-CM

## 2021-06-18 DIAGNOSIS — Z34.83 PRENATAL CARE, SUBSEQUENT PREGNANCY, THIRD TRIMESTER: Primary | ICD-10-CM

## 2021-06-18 PROCEDURE — 90715 TDAP VACCINE 7 YRS/> IM: CPT | Performed by: OBSTETRICS & GYNECOLOGY

## 2021-06-18 PROCEDURE — PNV: Performed by: OBSTETRICS & GYNECOLOGY

## 2021-06-18 PROCEDURE — 90471 IMMUNIZATION ADMIN: CPT | Performed by: OBSTETRICS & GYNECOLOGY

## 2021-06-18 NOTE — PROGRESS NOTES
Positive fetal movement, she is Rh negative she has received RhoGAM, normal Glucola test, to get Tdap vaccination today, reviewed kick counts return to office in 2 weeks

## 2021-06-18 NOTE — PATIENT INSTRUCTIONS
Positive fetal movement, to get Tdap vaccination today  Normal Glucola  Return to office in 2 weeks to continue to fetal kick counts

## 2021-06-24 ENCOUNTER — ULTRASOUND (OUTPATIENT)
Dept: PERINATAL CARE | Facility: OTHER | Age: 26
End: 2021-06-24
Payer: COMMERCIAL

## 2021-06-24 VITALS
WEIGHT: 202.6 LBS | HEIGHT: 68 IN | SYSTOLIC BLOOD PRESSURE: 127 MMHG | HEART RATE: 77 BPM | DIASTOLIC BLOOD PRESSURE: 85 MMHG | BODY MASS INDEX: 30.71 KG/M2

## 2021-06-24 DIAGNOSIS — Z3A.32 32 WEEKS GESTATION OF PREGNANCY: ICD-10-CM

## 2021-06-24 DIAGNOSIS — Z36.2 ENCOUNTER FOR OTHER ANTENATAL SCREENING FOLLOW-UP: Primary | ICD-10-CM

## 2021-06-24 PROCEDURE — 99212 OFFICE O/P EST SF 10 MIN: CPT | Performed by: OBSTETRICS & GYNECOLOGY

## 2021-06-24 PROCEDURE — 76816 OB US FOLLOW-UP PER FETUS: CPT | Performed by: OBSTETRICS & GYNECOLOGY

## 2021-06-24 NOTE — PROGRESS NOTES
The patient was seen today for an ultrasound  Please see ultrasound report (located under Ob Procedures) for additional details  Thank you very much for allowing us to participate in the care of this very nice patient  Should you have any questions, please do not hesitate to contact me  Michael Aponte MD [facility]  Attending Physician, Joanna

## 2021-06-24 NOTE — PATIENT INSTRUCTIONS
Kick Counts in Pregnancy   AMBULATORY CARE:   Kick counts  measure how much your baby is moving in your womb  A kick from your baby can be felt as a twist, turn, swish, roll, or jab  It is common to feel your baby kicking at 26 to 28 weeks of pregnancy  You may feel your baby kick as early as 20 weeks of pregnancy  Seek care immediately if:   · You feel your baby kick less as the day goes on      · You do not feel any kicks in a day  Contact your healthcare provider if:   · You feel a change in the number of kicks or movements of your baby  · You feel fewer than 10 kicks within 2 hours after counting  · You have questions or concerns about your baby's movements  Why measure kick counts:  Your baby's movement may provide information about your baby's health  He may move less, or not at all, if there are problems  He may move less if he does not have enough room to grow in your uterus (womb)  He may also move less if he is not getting enough oxygen or nutrition from the placenta  Tell your healthcare provider as soon as you feel a change in your baby's movements  Problems that are found earlier are easier to treat  When to measure kick counts:   · Measure kick counts at the same time every day  · Measure kick counts when your baby is awake and most active  Your baby may be most active in the evening  · Measure kick counts after a meal or snack or when your baby is usually most active  Your baby may be more active after you eat or in the evening  · You should not smoke while you are pregnant  Smoking increases the risk of health problems for you and for your baby during your pregnancy  If you do smoke, wait 2 hours to measure kick counts  Nicotine can make your baby more active than usual   How to measure kick counts:  Check that your baby is awake before you measure kick counts  You can wake up your baby by lightly pushing on your belly, walking, or drinking something cold   Your healthcare provider may tell you different ways to measure kick counts  He/She may tell you to do the following:  · Use a chart or clock to keep track of the time you start and finish counting  · Sit in a chair or lie on your left side  · Place your hands on the largest part of your belly  · Count until you reach 10 kicks  Write down how much time it takes to count 10 kicks  · It may take 30 minutes to 2 hours to count 10 kicks  It should not take more than 2 hours to count 10 kicks  If you do not feel 10 kicks within 2 hours, Call your Obstetrician    Follow up with your healthcare provider as directed:  Write down your questions so you remember to ask them during your visits  © 2017 [employer] Information is for End User's use only and may not be sold, redistributed or otherwise used for commercial purposes  All illustrations and images included in CareNotes® are the copyrighted property of [employer]  or [employer]  The above information is an  only  It is not intended as medical advice for individual conditions or treatments  Talk to your doctor, nurse or pharmacist before following any medical regimen to see if it is safe and effective for you

## 2021-07-01 ENCOUNTER — ROUTINE PRENATAL (OUTPATIENT)
Dept: OBGYN CLINIC | Facility: CLINIC | Age: 26
End: 2021-07-01

## 2021-07-01 VITALS — DIASTOLIC BLOOD PRESSURE: 82 MMHG | BODY MASS INDEX: 31.66 KG/M2 | SYSTOLIC BLOOD PRESSURE: 118 MMHG | WEIGHT: 205.2 LBS

## 2021-07-01 DIAGNOSIS — Z34.83 PRENATAL CARE, SUBSEQUENT PREGNANCY, THIRD TRIMESTER: Primary | ICD-10-CM

## 2021-07-01 PROCEDURE — PNV: Performed by: NURSE PRACTITIONER

## 2021-07-01 NOTE — PROGRESS NOTES
Donkolton Drain is doing well  Denies LOF/Bleeding/Cramping  She is experiencing pelvic discomfort  Recommended she get a pelvic girdle support band - they can be found on [employer]  +FM    Perineal massage handout given and reviewed with patient  Continue fetal kick counts  RTO in 2 weeks

## 2021-07-14 ENCOUNTER — TELEPHONE (OUTPATIENT)
Dept: OBGYN CLINIC | Facility: CLINIC | Age: 26
End: 2021-07-14

## 2021-07-14 NOTE — TELEPHONE ENCOUNTER
OB - 34 6/7 wk - her 21 month old has croup/medhat sx -she is now having congestion/cough, afebrile  Can take Robitussin/Sudafed  Has OB appt 7/16/2021

## 2021-07-16 ENCOUNTER — ROUTINE PRENATAL (OUTPATIENT)
Dept: OBGYN CLINIC | Facility: CLINIC | Age: 26
End: 2021-07-16

## 2021-07-16 VITALS — DIASTOLIC BLOOD PRESSURE: 70 MMHG | WEIGHT: 206.6 LBS | SYSTOLIC BLOOD PRESSURE: 140 MMHG | BODY MASS INDEX: 31.88 KG/M2

## 2021-07-16 DIAGNOSIS — Z34.83 PRENATAL CARE, SUBSEQUENT PREGNANCY, THIRD TRIMESTER: Primary | ICD-10-CM

## 2021-07-16 PROCEDURE — PNV: Performed by: OBSTETRICS & GYNECOLOGY

## 2021-07-16 NOTE — PROGRESS NOTES
Slightly elevated blood pressure probably secondary to Robitussin for cough  No signs symptoms of preeclampsia  Return to office in 1 week

## 2021-07-23 ENCOUNTER — ROUTINE PRENATAL (OUTPATIENT)
Dept: OBGYN CLINIC | Facility: CLINIC | Age: 26
End: 2021-07-23

## 2021-07-23 VITALS — WEIGHT: 207.6 LBS | DIASTOLIC BLOOD PRESSURE: 70 MMHG | SYSTOLIC BLOOD PRESSURE: 122 MMHG | BODY MASS INDEX: 32.03 KG/M2

## 2021-07-23 DIAGNOSIS — Z34.83 PRENATAL CARE, SUBSEQUENT PREGNANCY, THIRD TRIMESTER: Primary | ICD-10-CM

## 2021-07-23 DIAGNOSIS — Z36.85 ANTENATAL SCREENING FOR STREPTOCOCCUS B: ICD-10-CM

## 2021-07-23 PROCEDURE — 87150 DNA/RNA AMPLIFIED PROBE: CPT | Performed by: OBSTETRICS & GYNECOLOGY

## 2021-07-23 PROCEDURE — PNV: Performed by: OBSTETRICS & GYNECOLOGY

## 2021-07-23 NOTE — PATIENT INSTRUCTIONS
The patient will continue to look for signs symptoms of labor, continue fetal kick counts, return my office in 1 week

## 2021-07-25 LAB — GP B STREP DNA SPEC QL NAA+PROBE: NEGATIVE

## 2021-07-29 ENCOUNTER — ROUTINE PRENATAL (OUTPATIENT)
Dept: OBGYN CLINIC | Facility: CLINIC | Age: 26
End: 2021-07-29

## 2021-07-29 VITALS — BODY MASS INDEX: 32.25 KG/M2 | WEIGHT: 209 LBS | SYSTOLIC BLOOD PRESSURE: 118 MMHG | DIASTOLIC BLOOD PRESSURE: 66 MMHG

## 2021-07-29 DIAGNOSIS — Z34.83 PRENATAL CARE, SUBSEQUENT PREGNANCY, THIRD TRIMESTER: Primary | ICD-10-CM

## 2021-07-29 PROCEDURE — PNV: Performed by: NURSE PRACTITIONER

## 2021-07-29 NOTE — PROGRESS NOTES
Bessie Brown is doing well  Denies LOF/Bleeding/Cramping  +FM    Continue fetal kick counts  Reviewed S/S of labor  RTO in 1 week  Patty Kovacs was seen today for routine prenatal visit      Diagnoses and all orders for this visit:    Prenatal care, subsequent pregnancy, third trimester

## 2021-08-06 ENCOUNTER — ROUTINE PRENATAL (OUTPATIENT)
Dept: OBGYN CLINIC | Facility: CLINIC | Age: 26
End: 2021-08-06

## 2021-08-06 VITALS — SYSTOLIC BLOOD PRESSURE: 124 MMHG | WEIGHT: 210.8 LBS | BODY MASS INDEX: 32.53 KG/M2 | DIASTOLIC BLOOD PRESSURE: 80 MMHG

## 2021-08-06 DIAGNOSIS — Z34.83 PRENATAL CARE, SUBSEQUENT PREGNANCY, THIRD TRIMESTER: Primary | ICD-10-CM

## 2021-08-06 PROCEDURE — PNV: Performed by: OBSTETRICS & GYNECOLOGY

## 2021-08-06 NOTE — PROGRESS NOTES
Positive fetal movement to look for signs symptoms of labor return to the office in 1 week consider induction if not delivered next week

## 2021-08-06 NOTE — PATIENT INSTRUCTIONS
Positive fetal movement some cervical change to watch for labor return to office in 1 week continue doing fetal kick counts

## 2021-08-13 ENCOUNTER — ROUTINE PRENATAL (OUTPATIENT)
Dept: OBGYN CLINIC | Facility: CLINIC | Age: 26
End: 2021-08-13

## 2021-08-13 VITALS — DIASTOLIC BLOOD PRESSURE: 80 MMHG | WEIGHT: 213.2 LBS | SYSTOLIC BLOOD PRESSURE: 130 MMHG | BODY MASS INDEX: 32.9 KG/M2

## 2021-08-13 DIAGNOSIS — Z34.83 PRENATAL CARE, SUBSEQUENT PREGNANCY, THIRD TRIMESTER: Primary | ICD-10-CM

## 2021-08-13 PROCEDURE — PNV: Performed by: OBSTETRICS & GYNECOLOGY

## 2021-08-13 NOTE — PATIENT INSTRUCTIONS
Positive fetal movement some more cervical change to watch for signs symptoms of labor over the weekend  Consider induction next week if not delivered  Return to office p r n  basis continue look for signs symptoms of labor

## 2021-08-13 NOTE — PROGRESS NOTES
Consider induction next week if not delivered over the weekend continue watch for fetal movement, look for signs of labor  Return to office on a p r n  basis

## 2021-08-16 ENCOUNTER — ANESTHESIA EVENT (INPATIENT)
Dept: ANESTHESIOLOGY | Facility: HOSPITAL | Age: 26
End: 2021-08-16
Payer: COMMERCIAL

## 2021-08-16 ENCOUNTER — ANESTHESIA (INPATIENT)
Dept: ANESTHESIOLOGY | Facility: HOSPITAL | Age: 26
End: 2021-08-16
Payer: COMMERCIAL

## 2021-08-16 ENCOUNTER — HOSPITAL ENCOUNTER (OUTPATIENT)
Dept: LABOR AND DELIVERY | Facility: HOSPITAL | Age: 26
Discharge: HOME | End: 2021-08-16
Payer: COMMERCIAL

## 2021-08-16 ENCOUNTER — HOSPITAL ENCOUNTER (INPATIENT)
Facility: HOSPITAL | Age: 26
LOS: 2 days | Discharge: HOME | End: 2021-08-18
Attending: OBSTETRICS & GYNECOLOGY | Admitting: OBSTETRICS & GYNECOLOGY
Payer: COMMERCIAL

## 2021-08-16 DIAGNOSIS — Z3A.39 39 WEEKS GESTATION OF PREGNANCY: ICD-10-CM

## 2021-08-16 LAB
ABO GROUP BLD: NORMAL
BASE EXCESS BLDCOA CALC-SCNC: -2.3 MMOL/L (ref 3–11)
BASE EXCESS BLDCOV CALC-SCNC: -2.8 MMOL/L (ref 1–9)
BASOPHILS # BLD AUTO: 0.06 THOUSANDS/ΜL (ref 0–0.1)
BASOPHILS NFR BLD AUTO: 1 % (ref 0–1)
BLD GP AB SCN SERPL QL: NEGATIVE
EOSINOPHIL # BLD AUTO: 0.29 THOUSAND/ΜL (ref 0–0.61)
EOSINOPHIL NFR BLD AUTO: 3 % (ref 0–6)
ERYTHROCYTE [DISTWIDTH] IN BLOOD BY AUTOMATED COUNT: 12.3 % (ref 11.6–15.1)
HCO3 BLDCOA-SCNC: 24.9 MMOL/L (ref 17.3–27.3)
HCO3 BLDCOV-SCNC: 21.1 MMOL/L (ref 12.2–28.6)
HCT VFR BLD AUTO: 38.6 % (ref 34.8–46.1)
HGB BLD-MCNC: 13.4 G/DL (ref 11.5–15.4)
IMM GRANULOCYTES # BLD AUTO: 0.15 THOUSAND/UL (ref 0–0.2)
IMM GRANULOCYTES NFR BLD AUTO: 1 % (ref 0–2)
LYMPHOCYTES # BLD AUTO: 1.66 THOUSANDS/ΜL (ref 0.6–4.47)
LYMPHOCYTES NFR BLD AUTO: 15 % (ref 14–44)
MCH RBC QN AUTO: 33.2 PG (ref 26.8–34.3)
MCHC RBC AUTO-ENTMCNC: 34.7 G/DL (ref 31.4–37.4)
MCV RBC AUTO: 96 FL (ref 82–98)
MONOCYTES # BLD AUTO: 0.95 THOUSAND/ΜL (ref 0.17–1.22)
MONOCYTES NFR BLD AUTO: 9 % (ref 4–12)
NEUTROPHILS # BLD AUTO: 7.89 THOUSANDS/ΜL (ref 1.85–7.62)
NEUTS SEG NFR BLD AUTO: 71 % (ref 43–75)
NRBC BLD AUTO-RTO: 0 /100 WBCS
O2 CT VFR BLDCOA CALC: 10 ML/DL
OXYHGB MFR BLDCOA: 44.7 %
OXYHGB MFR BLDCOV: 86 %
PCO2 BLDCOA: 51.7 MM[HG] (ref 30–60)
PCO2 BLDCOV: 34.4 MM HG (ref 27–43)
PH BLDCOA: 7.3 [PH] (ref 7.23–7.43)
PH BLDCOV: 7.41 [PH] (ref 7.19–7.49)
PLATELET # BLD AUTO: 325 THOUSANDS/UL (ref 149–390)
PMV BLD AUTO: 10.4 FL (ref 8.9–12.7)
PO2 BLDCOA: 19.4 MM HG (ref 5–25)
PO2 BLDCOV: 39.5 MM HG (ref 15–45)
RBC # BLD AUTO: 4.04 MILLION/UL (ref 3.81–5.12)
RH BLD: NEGATIVE
RPR SER QL: NORMAL
SAO2 % BLDCOV: 17.9 ML/DL
SPECIMEN EXPIRATION DATE: NORMAL
WBC # BLD AUTO: 11 THOUSAND/UL (ref 4.31–10.16)

## 2021-08-16 PROCEDURE — 85025 COMPLETE CBC W/AUTO DIFF WBC: CPT | Performed by: OBSTETRICS & GYNECOLOGY

## 2021-08-16 PROCEDURE — 86592 SYPHILIS TEST NON-TREP QUAL: CPT | Performed by: OBSTETRICS & GYNECOLOGY

## 2021-08-16 PROCEDURE — 10907ZC DRAINAGE OF AMNIOTIC FLUID, THERAPEUTIC FROM PRODUCTS OF CONCEPTION, VIA NATURAL OR ARTIFICIAL OPENING: ICD-10-PCS | Performed by: OBSTETRICS & GYNECOLOGY

## 2021-08-16 PROCEDURE — 0HQ9XZZ REPAIR PERINEUM SKIN, EXTERNAL APPROACH: ICD-10-PCS | Performed by: OBSTETRICS & GYNECOLOGY

## 2021-08-16 PROCEDURE — 86900 BLOOD TYPING SEROLOGIC ABO: CPT | Performed by: OBSTETRICS & GYNECOLOGY

## 2021-08-16 PROCEDURE — 86901 BLOOD TYPING SEROLOGIC RH(D): CPT | Performed by: OBSTETRICS & GYNECOLOGY

## 2021-08-16 PROCEDURE — NC001 PR NO CHARGE: Performed by: OBSTETRICS & GYNECOLOGY

## 2021-08-16 PROCEDURE — 3E033VJ INTRODUCTION OF OTHER HORMONE INTO PERIPHERAL VEIN, PERCUTANEOUS APPROACH: ICD-10-PCS | Performed by: OBSTETRICS & GYNECOLOGY

## 2021-08-16 PROCEDURE — 82805 BLOOD GASES W/O2 SATURATION: CPT | Performed by: OBSTETRICS & GYNECOLOGY

## 2021-08-16 PROCEDURE — 86850 RBC ANTIBODY SCREEN: CPT | Performed by: OBSTETRICS & GYNECOLOGY

## 2021-08-16 PROCEDURE — 59400 OBSTETRICAL CARE: CPT | Performed by: OBSTETRICS & GYNECOLOGY

## 2021-08-16 RX ORDER — ACETAMINOPHEN 325 MG/1
650 TABLET ORAL EVERY 4 HOURS PRN
Status: DISCONTINUED | OUTPATIENT
Start: 2021-08-16 | End: 2021-08-18 | Stop reason: HOSPADM

## 2021-08-16 RX ORDER — CALCIUM CARBONATE 200(500)MG
1000 TABLET,CHEWABLE ORAL DAILY PRN
Status: DISCONTINUED | OUTPATIENT
Start: 2021-08-16 | End: 2021-08-18 | Stop reason: HOSPADM

## 2021-08-16 RX ORDER — DOCUSATE SODIUM 100 MG/1
100 CAPSULE, LIQUID FILLED ORAL 2 TIMES DAILY
Status: DISCONTINUED | OUTPATIENT
Start: 2021-08-16 | End: 2021-08-18 | Stop reason: HOSPADM

## 2021-08-16 RX ORDER — ONDANSETRON 2 MG/ML
4 INJECTION INTRAMUSCULAR; INTRAVENOUS EVERY 6 HOURS PRN
Status: DISCONTINUED | OUTPATIENT
Start: 2021-08-16 | End: 2021-08-16

## 2021-08-16 RX ORDER — IBUPROFEN 600 MG/1
600 TABLET ORAL EVERY 6 HOURS PRN
Status: DISCONTINUED | OUTPATIENT
Start: 2021-08-16 | End: 2021-08-18 | Stop reason: HOSPADM

## 2021-08-16 RX ORDER — DIPHENHYDRAMINE HCL 25 MG
25 TABLET ORAL EVERY 6 HOURS PRN
Status: DISCONTINUED | OUTPATIENT
Start: 2021-08-16 | End: 2021-08-18 | Stop reason: HOSPADM

## 2021-08-16 RX ORDER — FENTANYL CITRATE 50 UG/ML
INJECTION, SOLUTION INTRAMUSCULAR; INTRAVENOUS AS NEEDED
Status: DISCONTINUED | OUTPATIENT
Start: 2021-08-16 | End: 2021-08-16 | Stop reason: HOSPADM

## 2021-08-16 RX ORDER — OXYTOCIN/RINGER'S LACTATE 30/500 ML
250 PLASTIC BAG, INJECTION (ML) INTRAVENOUS ONCE
Status: DISCONTINUED | OUTPATIENT
Start: 2021-08-16 | End: 2021-08-18 | Stop reason: HOSPADM

## 2021-08-16 RX ORDER — SODIUM CHLORIDE, SODIUM LACTATE, POTASSIUM CHLORIDE, CALCIUM CHLORIDE 600; 310; 30; 20 MG/100ML; MG/100ML; MG/100ML; MG/100ML
125 INJECTION, SOLUTION INTRAVENOUS CONTINUOUS
Status: DISCONTINUED | OUTPATIENT
Start: 2021-08-16 | End: 2021-08-18 | Stop reason: HOSPADM

## 2021-08-16 RX ORDER — DIAPER,BRIEF,INFANT-TODD,DISP
1 EACH MISCELLANEOUS 4 TIMES DAILY PRN
Status: DISCONTINUED | OUTPATIENT
Start: 2021-08-16 | End: 2021-08-18 | Stop reason: HOSPADM

## 2021-08-16 RX ORDER — ONDANSETRON 2 MG/ML
4 INJECTION INTRAMUSCULAR; INTRAVENOUS EVERY 8 HOURS PRN
Status: DISCONTINUED | OUTPATIENT
Start: 2021-08-16 | End: 2021-08-18 | Stop reason: HOSPADM

## 2021-08-16 RX ORDER — OXYTOCIN/RINGER'S LACTATE 30/500 ML
1-30 PLASTIC BAG, INJECTION (ML) INTRAVENOUS
Status: DISCONTINUED | OUTPATIENT
Start: 2021-08-16 | End: 2021-08-18 | Stop reason: HOSPADM

## 2021-08-16 RX ORDER — LIDOCAINE HYDROCHLORIDE AND EPINEPHRINE 15; 5 MG/ML; UG/ML
INJECTION, SOLUTION EPIDURAL
Status: COMPLETED | OUTPATIENT
Start: 2021-08-16 | End: 2021-08-16

## 2021-08-16 RX ORDER — ROPIVACAINE HYDROCHLORIDE 2 MG/ML
INJECTION, SOLUTION EPIDURAL; INFILTRATION; PERINEURAL AS NEEDED
Status: DISCONTINUED | OUTPATIENT
Start: 2021-08-16 | End: 2021-08-16 | Stop reason: HOSPADM

## 2021-08-16 RX ORDER — BUPIVACAINE HYDROCHLORIDE 2.5 MG/ML
INJECTION, SOLUTION EPIDURAL; INFILTRATION; INTRACAUDAL AS NEEDED
Status: DISCONTINUED | OUTPATIENT
Start: 2021-08-16 | End: 2021-08-16 | Stop reason: HOSPADM

## 2021-08-16 RX ORDER — SIMETHICONE 80 MG
80 TABLET,CHEWABLE ORAL 4 TIMES DAILY PRN
Status: DISCONTINUED | OUTPATIENT
Start: 2021-08-16 | End: 2021-08-18 | Stop reason: HOSPADM

## 2021-08-16 RX ADMIN — ROPIVACAINE HYDROCHLORIDE 5 ML: 2 INJECTION, SOLUTION EPIDURAL; INFILTRATION at 13:27

## 2021-08-16 RX ADMIN — ONDANSETRON 4 MG: 2 INJECTION INTRAMUSCULAR; INTRAVENOUS at 15:04

## 2021-08-16 RX ADMIN — SODIUM CHLORIDE, SODIUM LACTATE, POTASSIUM CHLORIDE, AND CALCIUM CHLORIDE 125 ML/HR: .6; .31; .03; .02 INJECTION, SOLUTION INTRAVENOUS at 08:30

## 2021-08-16 RX ADMIN — SODIUM CHLORIDE, SODIUM LACTATE, POTASSIUM CHLORIDE, AND CALCIUM CHLORIDE 125 ML/HR: .6; .31; .03; .02 INJECTION, SOLUTION INTRAVENOUS at 12:46

## 2021-08-16 RX ADMIN — IBUPROFEN 600 MG: 600 TABLET ORAL at 19:47

## 2021-08-16 RX ADMIN — FENTANYL CITRATE 100 MCG: 50 INJECTION INTRAMUSCULAR; INTRAVENOUS at 15:46

## 2021-08-16 RX ADMIN — ROPIVACAINE HYDROCHLORIDE 5 ML: 2 INJECTION, SOLUTION EPIDURAL; INFILTRATION at 15:13

## 2021-08-16 RX ADMIN — DOCUSATE SODIUM 100 MG: 100 CAPSULE ORAL at 18:14

## 2021-08-16 RX ADMIN — LIDOCAINE HYDROCHLORIDE AND EPINEPHRINE 3 ML: 15; 5 INJECTION, SOLUTION EPIDURAL at 13:24

## 2021-08-16 RX ADMIN — ROPIVACAINE HYDROCHLORIDE 5 ML: 2 INJECTION, SOLUTION EPIDURAL; INFILTRATION at 13:31

## 2021-08-16 RX ADMIN — HYDROCORTISONE 1 APPLICATION: 1 CREAM TOPICAL at 18:14

## 2021-08-16 RX ADMIN — ROPIVACAINE HYDROCHLORIDE: 2 INJECTION, SOLUTION EPIDURAL; INFILTRATION at 13:30

## 2021-08-16 RX ADMIN — Medication 2 MILLI-UNITS/MIN: at 09:30

## 2021-08-16 RX ADMIN — BUPIVACAINE HYDROCHLORIDE 5 ML: 2.5 INJECTION, SOLUTION EPIDURAL; INFILTRATION; INTRACAUDAL; PERINEURAL at 15:46

## 2021-08-16 RX ADMIN — BENZOCAINE AND LEVOMENTHOL: 200; 5 SPRAY TOPICAL at 18:14

## 2021-08-16 RX ADMIN — ACETAMINOPHEN 650 MG: 325 TABLET, FILM COATED ORAL at 21:46

## 2021-08-16 RX ADMIN — WITCH HAZEL 1 PAD: 500 SOLUTION RECTAL; TOPICAL at 18:14

## 2021-08-16 NOTE — ANESTHESIA PREPROCEDURE EVALUATION
Procedure:  LABOR ANALGESIA    Relevant Problems   /RENAL   (+) Pregnancy complicated by nephrolithiasis in third trimester, antepartum      GYN   (+) 39 weeks gestation of pregnancy       @ 39+4 for EIOL      Lab Results   Component Value Date    WBC 11 00 (H) 2021    HGB 13 4 2021    HCT 38 6 2021    MCV 96 2021     2021     No results found for: NA, K, CO2, CL, BUN, CREATININE  No results found for: INR, PROTIME  No results found for: PTT    No results found for: GLUCOSE    No results found for: HGBA1C    Type and Screen:  A         Anesthesia Plan  ASA Score- 2     Anesthesia Type- epidural with ASA Monitors  Additional Monitors:   Airway Plan:           Plan Factors-Exercise tolerance (METS): >4 METS  Chart reviewed  Existing labs reviewed  Patient summary reviewed  Induction-     Postoperative Plan-     Informed Consent- Anesthetic plan and risks discussed with patient  I personally reviewed this patient with the CRNA  Discussed and agreed on the Anesthesia Plan with the CRNA  Nj Moreno

## 2021-08-16 NOTE — ANESTHESIA PROCEDURE NOTES
Epidural Block    Patient location during procedure: OB  Start time: 8/16/2021 1:23 PM  Reason for block: procedure for pain and at surgeon's request  Staffing  Performed: anesthesiologist   Anesthesiologist: Giselle Zarate MD  Preanesthetic Checklist  Completed: patient identified, IV checked, risks and benefits discussed, surgical consent, monitors and equipment checked, pre-op evaluation and timeout performed  Epidural  Patient position: sitting  Prep: ChloraPrep  Patient monitoring: cardiac monitor and frequent blood pressure checks  Approach: midline  Location: lumbar  Injection technique: JC saline  Needle  Needle type: Tuohy   Needle gauge: 17 G  Catheter type: side hole  Catheter size: 19 G  Catheter at skin depth: 11 cm  Catheter securement method: stabilization device  Test dose: negativelidocaine 1 5% with epinephrine 1:200,000 test dose, 3 mL  Assessment  Number of attempts: 1negative aspiration for CSF, negative aspiration for heme and no paresthesia on injection  patient tolerated the procedure well with no immediate complications

## 2021-08-16 NOTE — PLAN OF CARE
Problem: PAIN - ADULT  Goal: Verbalizes/displays adequate comfort level or baseline comfort level  Description: Interventions:  - Encourage patient to monitor pain and request assistance  - Assess pain using appropriate pain scale  - Administer analgesics based on type and severity of pain and evaluate response  - Implement non-pharmacological measures as appropriate and evaluate response  - Consider cultural and social influences on pain and pain management  - Notify physician/advanced practitioner if interventions unsuccessful or patient reports new pain  Outcome: Progressing     Problem: INFECTION - ADULT  Goal: Absence or prevention of progression during hospitalization  Description: INTERVENTIONS:  - Assess and monitor for signs and symptoms of infection  - Monitor lab/diagnostic results  - Monitor all insertion sites, i e  indwelling lines, tubes, and drains  - Monitor endotracheal if appropriate and nasal secretions for changes in amount and color  - [place] appropriate cooling/warming therapies per order  - Administer medications as ordered  - Instruct and encourage patient and family to use good hand hygiene technique  - Identify and instruct in appropriate isolation precautions for identified infection/condition  Outcome: Progressing  Goal: Absence of fever/infection during neutropenic period  Description: INTERVENTIONS:  - Monitor WBC    Outcome: Progressing     Problem: SAFETY ADULT  Goal: Patient will remain free of falls  Description: INTERVENTIONS:  - Educate patient/family on patient safety including physical limitations  - Instruct patient to call for assistance with activity   - Consult OT/PT to assist with strengthening/mobility   - Keep Call bell within reach  - Keep bed low and locked with side rails adjusted as appropriate  - Keep care items and personal belongings within reach  - Initiate and maintain comfort rounds  - Make Fall Risk Sign visible to staff  - Offer Toileting as needed in advance of need  - Initiate/Maintain alarm  - Obtain necessary fall risk management equipment:   - Apply yellow socks and bracelet for high fall risk patients  - Consider moving patient to room near nurses station  Outcome: Progressing  Goal: Maintain or return to baseline ADL function  Description: INTERVENTIONS:  -  Assess patient's ability to carry out ADLs; assess patient's baseline for ADL function and identify physical deficits which impact ability to perform ADLs (bathing, care of mouth/teeth, toileting, grooming, dressing, etc )  - Assess/evaluate cause of self-care deficits   - Assess range of motion  - Assess patient's mobility; develop plan if impaired  - Assess patient's need for assistive devices and provide as appropriate  - Encourage maximum independence but intervene and supervise when necessary  - Involve family in performance of ADLs  - Assess for home care needs following discharge   - Consider OT consult to assist with ADL evaluation and planning for discharge  - Provide patient education as appropriate  Outcome: Progressing  Goal: Maintains/Returns to pre admission functional level  Description: INTERVENTIONS:  - Perform BMAT or MOVE assessment daily    - Set and communicate daily mobility goal to care team and patient/family/caregiver  - Collaborate with rehabilitation services on mobility goals if consulted  - Perform Range of Motion as needed  - Reposition patient as needed  - Dangle patient as needed  - Stand patient as needed  - Ambulate patient as needed  - Out of bed to chair as needed  - Out of bed for meals as needed  - Out of bed for toileting  - Record patient progress and toleration of activity level   Outcome: Progressing     Problem: Knowledge Deficit  Goal: Patient/family/caregiver demonstrates understanding of disease process, treatment plan, medications, and discharge instructions  Description: Complete learning assessment and assess knowledge base    Interventions:  - Provide teaching at level of understanding  - Provide teaching via preferred learning methods  Outcome: Progressing  Goal: Verbalizes understanding of labor plan  Description: Assess patient/family/caregiver's baseline knowledge level and ability to understand information  Provide education via patient/family/caregiver's preferred learning method at appropriate level of understanding  1  Provide teaching at level of understanding  2  Provide teaching via preferred learning method(s)  Outcome: Progressing     Problem: DISCHARGE PLANNING  Goal: Discharge to home or other facility with appropriate resources  Description: INTERVENTIONS:  - Identify barriers to discharge w/patient and caregiver  - Arrange for needed discharge resources and transportation as appropriate  - Identify discharge learning needs (meds, wound care, etc )  - Arrange for interpretive services to assist at discharge as needed  - Refer to Case Management Department for coordinating discharge planning if the patient needs post-hospital services based on physician/advanced practitioner order or complex needs related to functional status, cognitive ability, or social support system  Outcome: Progressing     Problem: Labor & Delivery  Goal: Manages discomfort  Description: Assess and monitor for signs and symptoms of discomfort  Assess patient's pain level regularly and per hospital policy  Administer medications as ordered  Support use of nonpharmacological methods to help control pain such as distraction, imagery, relaxation, and application of heat and cold  Collaborate with interdisciplinary team and patient to determine appropriate pain management plan  1  Include patient in decisions related to comfort  2  Offer non-pharmacological pain management interventions  3  Report ineffective pain management to physician  Outcome: Progressing  Goal: Patient vital signs are stable  Description: 1  Assess vital signs - vaginal delivery    Outcome: Progressing     Problem: BIRTH - VAGINAL/ SECTION  Goal: Fetal and maternal status remain reassuring during the birth process  Description: INTERVENTIONS:  - Monitor vital signs  - Monitor fetal heart rate  - Monitor uterine activity  - Monitor labor progression (vaginal delivery)  - DVT prophylaxis  - Antibiotic prophylaxis  Outcome: Progressing  Goal: Emotionally satisfying birthing experience for mother/fetus  Description: Interventions:  - Assess, plan, implement and evaluate the nursing care given to the patient in labor  - Advocate the philosophy that each childbirth experience is a unique experience and support the family's chosen level of involvement and control during the labor process   - Actively participate in both the patient's and family's teaching of the birth process  - Consider cultural, Mu-ism and age-specific factors and plan care for the patient in labor  Outcome: Progressing

## 2021-08-16 NOTE — PLAN OF CARE
Problem: PAIN - ADULT  Goal: Verbalizes/displays adequate comfort level or baseline comfort level  Description: Interventions:  - Encourage patient to monitor pain and request assistance  - Assess pain using appropriate pain scale  - Administer analgesics based on type and severity of pain and evaluate response  - Implement non-pharmacological measures as appropriate and evaluate response  - Consider cultural and social influences on pain and pain management  - Notify physician/advanced practitioner if interventions unsuccessful or patient reports new pain  Outcome: Progressing     Problem: INFECTION - ADULT  Goal: Absence or prevention of progression during hospitalization  Description: INTERVENTIONS:  - Assess and monitor for signs and symptoms of infection  - Monitor lab/diagnostic results  - Monitor all insertion sites, i e  indwelling lines, tubes, and drains  - Monitor endotracheal if appropriate and nasal secretions for changes in amount and color  - [place] appropriate cooling/warming therapies per order  - Administer medications as ordered  - Instruct and encourage patient and family to use good hand hygiene technique  - Identify and instruct in appropriate isolation precautions for identified infection/condition  Outcome: Progressing  Goal: Absence of fever/infection during neutropenic period  Description: INTERVENTIONS:  - Monitor WBC    Outcome: Progressing     Problem: SAFETY ADULT  Goal: Patient will remain free of falls  Description: INTERVENTIONS:  - Educate patient/family on patient safety including physical limitations  - Instruct patient to call for assistance with activity   - Consult OT/PT to assist with strengthening/mobility   - Keep Call bell within reach  - Keep bed low and locked with side rails adjusted as appropriate  - Keep care items and personal belongings within reach  - Initiate and maintain comfort rounds  - Make Fall Risk Sign visible to staff  - Offer Toileting as needed in advance of need  - Initiate/Maintain alarm  - Obtain necessary fall risk management equipment:   - Apply yellow socks and bracelet for high fall risk patients  - Consider moving patient to room near nurses station  Outcome: Progressing  Goal: Maintain or return to baseline ADL function  Description: INTERVENTIONS:  -  Assess patient's ability to carry out ADLs; assess patient's baseline for ADL function and identify physical deficits which impact ability to perform ADLs (bathing, care of mouth/teeth, toileting, grooming, dressing, etc )  - Assess/evaluate cause of self-care deficits   - Assess range of motion  - Assess patient's mobility; develop plan if impaired  - Assess patient's need for assistive devices and provide as appropriate  - Encourage maximum independence but intervene and supervise when necessary  - Involve family in performance of ADLs  - Assess for home care needs following discharge   - Consider OT consult to assist with ADL evaluation and planning for discharge  - Provide patient education as appropriate  Outcome: Progressing  Goal: Maintains/Returns to pre admission functional level  Description: INTERVENTIONS:  - Perform BMAT or MOVE assessment daily    - Set and communicate daily mobility goal to care team and patient/family/caregiver     - Collaborate with rehabilitation services on mobility goals if consulted  - Perform Range of Motion as needed  - Reposition patient as needed  - Dangle patient as needed  - Stand patient as needed  - Ambulate patient as needed  - Out of bed to chair as needed  - Out of bed for meals as needed  - Out of bed for toileting  - Record patient progress and toleration of activity level   Outcome: Progressing     Problem: SAFETY ADULT  Goal: Maintain or return to baseline ADL function  Description: INTERVENTIONS:  -  Assess patient's ability to carry out ADLs; assess patient's baseline for ADL function and identify physical deficits which impact ability to perform ADLs (bathing, care of mouth/teeth, toileting, grooming, dressing, etc )  - Assess/evaluate cause of self-care deficits   - Assess range of motion  - Assess patient's mobility; develop plan if impaired  - Assess patient's need for assistive devices and provide as appropriate  - Encourage maximum independence but intervene and supervise when necessary  - Involve family in performance of ADLs  - Assess for home care needs following discharge   - Consider OT consult to assist with ADL evaluation and planning for discharge  - Provide patient education as appropriate  Outcome: Progressing     Problem: Knowledge Deficit  Goal: Patient/family/caregiver demonstrates understanding of disease process, treatment plan, medications, and discharge instructions  Description: Complete learning assessment and assess knowledge base  Interventions:  - Provide teaching at level of understanding  - Provide teaching via preferred learning methods  8/16/2021 1657 by Ryann Denney RN  Outcome: Completed  8/16/2021 0953 by Ryann Denney RN  Outcome: Progressing  Goal: Verbalizes understanding of labor plan  Description: Assess patient/family/caregiver's baseline knowledge level and ability to understand information  Provide education via patient/family/caregiver's preferred learning method at appropriate level of understanding  1  Provide teaching at level of understanding  2  Provide teaching via preferred learning method(s)    8/16/2021 1657 by Ryann Denney RN  Outcome: Completed  8/16/2021 0953 by Ryann Denney RN  Outcome: Progressing     Problem: DISCHARGE PLANNING  Goal: Discharge to home or other facility with appropriate resources  Description: INTERVENTIONS:  - Identify barriers to discharge w/patient and caregiver  - Arrange for needed discharge resources and transportation as appropriate  - Identify discharge learning needs (meds, wound care, etc )  - Arrange for interpretive services to assist at discharge as needed  - Refer to Case Management Department for coordinating discharge planning if the patient needs post-hospital services based on physician/advanced practitioner order or complex needs related to functional status, cognitive ability, or social support system  Outcome: Progressing     Problem: Labor & Delivery  Goal: Manages discomfort  Description: Assess and monitor for signs and symptoms of discomfort  Assess patient's pain level regularly and per hospital policy  Administer medications as ordered  Support use of nonpharmacological methods to help control pain such as distraction, imagery, relaxation, and application of heat and cold  Collaborate with interdisciplinary team and patient to determine appropriate pain management plan  1  Include patient in decisions related to comfort  2  Offer non-pharmacological pain management interventions  3  Report ineffective pain management to physician  2021 by Jose Garcia RN  Outcome: Completed  2021 by Jose Garcia RN  Outcome: Progressing  Goal: Patient vital signs are stable  Description: 1  Assess vital signs - vaginal delivery    2021 by Jose Garcia RN  Outcome: Completed  2021 by Jose Garcia RN  Outcome: Progressing     Problem: BIRTH - VAGINAL/ SECTION  Goal: Fetal and maternal status remain reassuring during the birth process  Description: INTERVENTIONS:  - Monitor vital signs  - Monitor fetal heart rate  - Monitor uterine activity  - Monitor labor progression (vaginal delivery)  - DVT prophylaxis  - Antibiotic prophylaxis  2021 by Jose Garcia RN  Outcome: Completed  2021 by Jose Garcia RN  Outcome: Progressing  Goal: Emotionally satisfying birthing experience for mother/fetus  Description: Interventions:  - Assess, plan, implement and evaluate the nursing care given to the patient in labor  - Advocate the philosophy that each childbirth experience is a unique experience and support the family's chosen level of involvement and control during the labor process   - Actively participate in both the patient's and family's teaching of the birth process  - Consider cultural, Mandaen and age-specific factors and plan care for the patient in labor  8/16/2021 1657 by Charbel Black, RN  Outcome: Completed  8/16/2021 [address] by Charbel Black, RN  Outcome: Progressing

## 2021-08-16 NOTE — H&P
H&P Exam - Obstetrics   Luis Poole 31 y o  female MRN: [de-identified]  Unit/Bed#: -01 Encounter: [id number]      History of Present Illness     Chief Complaint: Induction of labor    HPI:  Luis Poole is a 31 y o   female with an NEFTALI of 2021, by Last Menstrual Period at 39w4d weeks gestation who is being admitted for eIOL  Contractions: yes  Loss of fluid: no  Vaginal bleeding: no  Fetal movement: yes    She is a [facility] patient  PREGNANCY COMPLICATIONS:   1) none    OB History    Para Term  AB Living   2 1 1 0 0 1   SAB TAB Ectopic Multiple Live Births   0 0 0 0 1      # Outcome Date GA Lbr Kwame/2nd Weight Sex Delivery Anes PTL Lv   2 Current            1 Term 20 38w4d 14:15 / 00:52 3515 g (7 lb 12 oz) M Vag-Spont EPI N JULIANE       Baby complications/comments: none    Review of Systems   Constitutional: Negative for fever  HENT: Negative for rhinorrhea, sinus pressure, sneezing and sore throat  Eyes: Negative for visual disturbance  Respiratory: Negative for cough, shortness of breath and wheezing  Cardiovascular: Negative for chest pain, palpitations and leg swelling  Gastrointestinal: Negative for abdominal distention, abdominal pain, blood in stool, nausea and vomiting  Genitourinary: Negative for dysuria, flank pain, vaginal bleeding and vaginal discharge  Musculoskeletal: Negative for neck pain and neck stiffness  Skin: Negative for color change, pallor and rash  Neurological: Negative for weakness, light-headedness and numbness           Historical Information   Past Medical History:   Diagnosis Date    Abnormal Pap smear of cervix     ASCUS, (+) HPV - HAD COLPO DURING PREG - NO BIOPSIES - F/U AFTER DEL    Celiac disease     HPV (human papilloma virus) infection     Migraine     beg of pregnancy related to celiac - during preg    Rh incompatibility     had Rhogam @ 14 wk (bleeding) & 28 wk     Past Surgical History:   Procedure Laterality Date  AUGMENTATION BREAST      2016     Social History   Social History     Substance and Sexual Activity   Alcohol Use Not Currently    Alcohol/week: 4 0 standard drinks    Types: 4 Glasses of wine per week    Comment: occasional wine - none since preg     Social History     Substance and Sexual Activity   Drug Use Never     Social History     Tobacco Use   Smoking Status Never Smoker   Smokeless Tobacco Never Used     Family History:   Family History   Problem Relation Age of Onset    No Known Problems Mother     No Known Problems Father     No Known Problems Maternal Grandmother     No Known Problems Maternal Grandfather     No Known Problems Paternal Grandmother     Stroke Paternal Grandfather     No Known Problems Sister     Other Son         hypopadias and lymphatic malformation    No Known Problems Sister     No Known Problems Half-Sister        Meds/Allergies      Medications Prior to Admission   Medication    Docosahexaenoic Acid (DHA COMPLETE PO)    Prenatal Vit-Fe Fumarate-FA (PRENATAL VITAMIN PO)      No Known Allergies    OBJECTIVE:    Vitals: Last menstrual period 11/12/2020, not currently breastfeeding  There is no height or weight on file to calculate BMI  Physical Exam  Vitals and nursing note reviewed  Exam conducted with a chaperone present  Constitutional:       General: She is not in acute distress  Appearance: She is normal weight  HENT:      Head: Normocephalic  Right Ear: External ear normal       Left Ear: External ear normal       Nose: Nose normal       Mouth/Throat:      Mouth: Mucous membranes are moist       Pharynx: Oropharynx is clear  Eyes:      General:         Right eye: No discharge  Left eye: No discharge  Conjunctiva/sclera: Conjunctivae normal    Cardiovascular:      Rate and Rhythm: Normal rate and regular rhythm  Pulses: Normal pulses  Heart sounds: Normal heart sounds     Pulmonary:      Effort: Pulmonary effort is normal  No respiratory distress  Breath sounds: Normal breath sounds  Abdominal:      General: Bowel sounds are normal       Palpations: Abdomen is soft  Comments: Gravid uterus   Genitourinary:     General: Normal vulva  Musculoskeletal:         General: No swelling or tenderness  Normal range of motion  Cervical back: Normal range of motion and neck supple  Skin:     General: Skin is warm and dry  Capillary Refill: Capillary refill takes less than 2 seconds  Neurological:      Mental Status: She is alert           Fetus confirmed to be in vertex presentation by transabdominal ultrasound on admission     Nitrazine: neg    Cervix:   3/80/-1    Fetal heart rate:    Baseline: 145bpm  Category 1, no accelerations, no decelerations    [place]:    q3-4m    EFW: 4lbs 10oz (72%, via US 6/24)    GBS: Negative    Prenatal Labs:   Blood Type:   Lab Results   Component Value Date/Time    ABO Grouping A 01/18/2021 01:02 PM    ABO Grouping A 01/23/2020 05:37 PM     , D (Rh type):   Lab Results   Component Value Date/Time    Rh Type RH(D) NEGATIVE 01/18/2021 01:02 PM     , Antibody Screen: No results found for: ANTIBODYSCR , HCT/HGB:   Lab Results   Component Value Date/Time    HCT 37 9 06/02/2021 07:37 AM    Hematocrit 37 8 01/23/2020 04:07 PM    Hemoglobin 12 5 06/02/2021 07:37 AM    Hemoglobin 13 2 01/23/2020 04:07 PM      , MCV:   Lab Results   Component Value Date/Time     3 (H) 06/02/2021 07:37 AM    MCV 94 01/23/2020 04:07 PM      , Platelets:   Lab Results   Component Value Date/Time    Platelet Count 629 78/72/5216 07:37 AM    Platelets 834 64/24/4744 04:07 PM      , 1 hour Glucola:   Lab Results   Component Value Date/Time    Glucose 108 06/02/2021 07:37 AM   Rubella: Immune  VDRL/RPR:   Lab Results   Component Value Date/Time    RPR NON-REACTIVE 06/02/2021 07:37 AM    RPR Non-Reactive 01/23/2020 04:07 PM      , Urine Culture/Screen:   Hep B: Immube   HIV:   Lab Results   Component Value Date/Time HIV AG/AB, 4th Gen NON-REACTIVE 2021 01:02 PM     Group B Strep:    Lab Results   Component Value Date/Time    Strep Grp B PCR Negative 2021 12:26 PM    Strep Grp B PCR Negative for Beta Hemolytic Strep Grp B by PCR 2020 12:33 PM          Invasive Devices     None                   Assessment/Plan     ASSESSMENT:  27yo  at 39w4d weeks gestation who is being admitted for eIOL  PLAN:    - Admit  - CBC, RPR, Blood Type  - Start with pitocin  - GBS negative status   - Analgesia and/or epidural at patient request  - Anticipate     Discussed with Dr Gela Johnson      This patient will be an INPATIENT  and I certify the anticipated length of stay is >2 Midnights      Norma Carbone MD  2021  8:36 AM

## 2021-08-16 NOTE — L&D DELIVERY NOTE
Vaginal Delivery Summary - OB/GYN   Xochitl Record 31 y o  female MRN: [de-identified]  Unit/Bed#: -01 Encounter: [id number]          Predelivery Diagnosis:  1  Pregnancy at 39w4d   2  Rh negative    Postdelivery Diagnosis:  1  Same as above  2  Delivery of term     Procedure: Spontaneous Vaginal Delivery, repair of 1st degree laceration    Attending: Stephanie Hunt    Assistant: Neela Maravilla    Anesthesia: Epidural    QBL: 123cc  Admission H 4  Admission platelets: 197    Complications: none apparent    Specimens: cord blood, arterial and venous cord blood gasses, placenta to storage    Findings:   1  Viable male at 39w4d, with APGARS of 9 and 9 at 1 and 5 minutes respectively,  2  Spontaneous delivery of intact placenta   3  1st degree laceration repaired with 3-0 vicryl  4  Blood gases:    Umbilical Cord Venous Blood Gas:  Results from last 7 days   Lab Units 21  1613   PH COV  7 406   PCO2 COV mm HG 34 4   HCO3 COV mmol/L 21 1   BASE EXC COV mmol/L -2 8*   O2 CT CD VB mL/dL 17 9   O2 HGB, VENOUS CORD % 34 4     Umbilical Cord Arterial Blood Gas:  Results from last 7 days   Lab Units 21  1613   PH COA  7 300   PCO2 COA  51 7   PO2 COA mm HG 19 4   HCO3 COA mmol/L 24 9   BASE EXC COA mmol/L -2 3*   O2 CONTENT CORD ART ml/dl 10 0   O2 HGB, ARTERIAL CORD % 44 7       Disposition:  Patient tolerated the procedure well and was recovering in labor and delivery room     Brief history and labor course:  Ms Xochitl Ann is a 31 y o  Rosibel Ream at 39wk4d  She presented to labor and delivery for elective induction of labor  She was induced with pitocin, received and epidural, and an amniotomy was performed prior to her becoming fully dilated  Description of procedure    After pushing for 1 minute, at 1611 patient delivered a viable male , wt pending, apgars of 9 (1 min) and 9 (5 min)  The fetal vertex delivered spontaneously   Baby was checked for nuchal  There was a single loop of umbilical cord wrapped tightly around the neck that was reduced  The anterior shoulder delivered atraumatically with maternal expulsive forces and the assistance of downward traction  The posterior shoulder delivered with maternal expulsive forces and the assistance of upward traction  The remainder of the fetus delivered spontaneously  Upon delivery, the infant was placed on the mothers abdomen and the cord was clamped and cut  Delayed cord clamping was performed  The infant was noted to cry spontaneously and was moving all extremities appropriately  There was no evidence for injury  Awaiting nurse resuscitators evaluated the   Arterial and venous cord blood gases and cord blood was collected for analysis  These were promptly sent to the lab  In the immediate post-partum, 30 units of IV pitocin was administered, and the uterus was noted to contract down well with massage and pitocin  The placenta delivered spontaneously at 1625 and was noted to have a centrally inserted 3 vessel cord  The vagina, cervix, perineum, and rectum were inspected and there was noted to be a 1st degree laceration repaired with 3-0 vicryl rapide  Good hemostasis was obtained  At the conclusion of the procedure, all needle, sponge, and instrument counts were noted to be correct  Patient tolerated the procedure well and was allowed to recover in labor and delivery room with family and  before being transferred to the post-partum floor  Dr Babatunde Joseph was present and participated in all key portions of the case          Lalo Rogers MD PGY-1  Obstetrics & Gynecology  2021  4:48 PM

## 2021-08-16 NOTE — DISCHARGE SUMMARY
Discharge Summary - Trisha Faulkner 31 y o  female MRN: [de-identified]    Unit/Bed#: -01 Encounter: [id number]    Admission Date: 2021     Discharge Date: 2021    Admitting Diagnosis:   Patient Active Problem List   Diagnosis    Pregnancy complicated by nephrolithiasis in third trimester, antepartum    Spontaneous vaginal delivery    Family history of congenital anomaly       Discharge Diagnosis:   Same, delivered    Procedures:   spontaneous vaginal delivery    Admitting Attending: Dr Petar Lopez  Delivery Attending: Dr Bridgette Herron MD  Discharge Attending: Dr Rosalin Lefort:     Trisha Faulkner is a 31 y o   who was admitted at 39w4d for elective induction of labor  She was induced with pitocin, received and epidural, and an amniotomy was performed prior to her becoming fully dilated  She delivered a viable male  on 2021 at 46  Weight 9lbs 7oz via spontaneous vaginal delivery  Apgars were 9 (1 min) and 9 (5 min)   was transferred to  nursery  Patient tolerated the procedure well and was transferred to recovery in stable condition  Her post-partum course was uncomplicated  Her post-partum pain was well controlled with oral analgesics  The patient's post partum course was unremarkable  On day of discharge, she was ambulating and able to reasonably perform all ADLs  She was voiding and had appropriate bowel function  Pain was well controlled  She was discharged home on postpartum day #2 without complications  Patient was instructed to follow up with her OB as an outpatient and was given appropriate warnings to call doctor or provider if she develops signs of infection or uncontrolled pain  On day of discharge she was ambulating, voiding spontaneously, tolerating oral intake and hemodynamically stable  Mom's blood type is A negative and  Rhogam was given      Disposition: Home    Planned Readmission: No    Discharge Medications:   Prenatal vitamin daily for 6 months or the duration of nursing, whichever is longer  Motrin 600 mg orally every 6 hours as needed for pain  Tylenol (over the counter) per bottle directions as needed for pain  Hydrocortisone cream 1% (over the counter) applied 1-2x daily to perineum as needed  Witch hazel pads for perineal discomfort as needed    Discharge instructions :   -Do not place anything (no partner, tampons or douche) in your vagina for 6 weeks  -You may walk for exercise for the first 6 weeks then gradually return to your usual activities    -Please do not drive for 1 week if you have no stitches and for 2 weeks if you have stitches or underwent a  delivery     -You may take baths or shower per your preference    -Please look at your bust (breasts) in the mirror daily and call your doctor for redness or tenderness or increased warmth  - If you have had a  section please look at your incision daily as well and call provider for increasing redness or steady drainage from the incision    -Please call your doctor's office if temperature > 100 4*F or 38* C, worsening pain or a foul discharge      Follow Up:  - Follow up in 3 weeks for postpartum visit    Darren Garcia MD

## 2021-08-16 NOTE — OB LABOR/OXYTOCIN SAFETY PROGRESS
Oxytocin Safety Progress Check Note - Bessie Brown 31 y o  female MRN: [de-identified]    Unit/Bed#: -01 Encounter: [id number]    Dose (elsie-units/min) Oxytocin: 12 elsie-units/min  Contraction Frequency (minutes): 2-4  Contraction Quality: Moderate  Tachysystole: No   Cervical Dilation: 3-4        Cervical Effacement: 80  Fetal Station: -1  Baseline Rate: 135 bpm  Fetal Heart Rate: 140 BPM  FHR Category: Category I               Vital Signs:   Vitals:    08/16/21 1145   BP: 117/79   Pulse: 77   Resp:    Temp:            Notes/comments: Contractions becoming more intense, patient would like to get epidural and then I will AROM her once she is comfortable       Kota Patel MD 8/16/2021 12:23 PM

## 2021-08-17 LAB
ABO GROUP BLD: NORMAL
BLD GP AB SCN SERPL QL: NEGATIVE
FETAL CELL SCN BLD QL ROSETTE: NEGATIVE
RH BLD: NEGATIVE

## 2021-08-17 PROCEDURE — 86901 BLOOD TYPING SEROLOGIC RH(D): CPT | Performed by: OBSTETRICS & GYNECOLOGY

## 2021-08-17 PROCEDURE — 85461 HEMOGLOBIN FETAL: CPT | Performed by: OBSTETRICS & GYNECOLOGY

## 2021-08-17 PROCEDURE — 86900 BLOOD TYPING SEROLOGIC ABO: CPT | Performed by: OBSTETRICS & GYNECOLOGY

## 2021-08-17 PROCEDURE — 86850 RBC ANTIBODY SCREEN: CPT | Performed by: OBSTETRICS & GYNECOLOGY

## 2021-08-17 RX ADMIN — DOCUSATE SODIUM 100 MG: 100 CAPSULE ORAL at 09:10

## 2021-08-17 RX ADMIN — ACETAMINOPHEN 650 MG: 325 TABLET, FILM COATED ORAL at 03:41

## 2021-08-17 RX ADMIN — IBUPROFEN 600 MG: 600 TABLET ORAL at 01:57

## 2021-08-17 RX ADMIN — IBUPROFEN 600 MG: 600 TABLET ORAL at 13:52

## 2021-08-17 RX ADMIN — DOCUSATE SODIUM 100 MG: 100 CAPSULE ORAL at 17:42

## 2021-08-17 RX ADMIN — HUMAN RHO(D) IMMUNE GLOBULIN 300 MCG: 300 INJECTION, SOLUTION INTRAMUSCULAR at 12:12

## 2021-08-17 RX ADMIN — ACETAMINOPHEN 650 MG: 325 TABLET, FILM COATED ORAL at 06:47

## 2021-08-17 RX ADMIN — ACETAMINOPHEN 650 MG: 325 TABLET, FILM COATED ORAL at 17:42

## 2021-08-17 RX ADMIN — IBUPROFEN 600 MG: 600 TABLET ORAL at 19:34

## 2021-08-17 RX ADMIN — ACETAMINOPHEN 650 MG: 325 TABLET, FILM COATED ORAL at 12:11

## 2021-08-17 RX ADMIN — ACETAMINOPHEN 650 MG: 325 TABLET, FILM COATED ORAL at 23:55

## 2021-08-17 RX ADMIN — IBUPROFEN 600 MG: 600 TABLET ORAL at 06:47

## 2021-08-17 NOTE — LACTATION NOTE
This note was copied from a baby's chart  CONSULT - LACTATION  Baby Boy Jean Musa) Lola 1 days male MRN: [de-identified]    [facility]REG[facility] NURSERY Room / Bed: (N)/(N) Encounter: [id number]    Maternal Information     MOTHER:  [de-identified]  Maternal Age: 31 y o    OB History: # 1 - Date: 20, Sex: Male, Weight: 3515 g (7 lb 12 oz), GA: 38w4d, Delivery: Vaginal, Spontaneous, Apgar1: 9, Apgar5: 9, Living: Living, Birth Comments: None    # 2 - Date: 21, Sex: Male, Weight: 4280 g (9 lb 7 oz), GA: 39w4d, Delivery: Vaginal, Spontaneous, Apgar1: 9, Apgar5: 9, Living: Living, Birth Comments: None   Previouse breast reduction surgery? No Mom had a breast augmentation in 2016  Mom states surgery was behind the muscle  No problems breastfeeding her first child  Lactation history:   Has patient previously breast fed: Yes   How long had patient previously breast fed: 8 mo   Previous breast feeding complications: Low milk supply, Other (Comment) (severe mastitis due to infant surgery)     Past Surgical History:   Procedure Laterality Date    AUGMENTATION BREAST      2016        Birth information:  YOB: 2022   Time of birth: 4:11 PM   Sex: male   Delivery type: Vaginal, Spontaneous   Birth Weight: 4280 g (9 lb 7 oz)   Percent of Weight Change: -4%     Gestational Age: 43w3d   [unfilled]    Assessment     Breast and nipple assessment: round breasts with everted nipples and darker areolas   Assessment: no clinical assessment completed during this consult    Feeding assessment: baby was latched during consult  Education provided to assist with positioning    LATCH:  Latch: Grasps breast, tongue down, lips flanged, rhythmic sucking   Audible Swallowing: Spontaneous and intermittent (24 hours old)   Type of Nipple: Everted (After stimulation)   Comfort (Breast/Nipple): Soft/non-tender   Hold (Positioning): Partial assist, teach one side, mother does other, staff holds   [facility] Score: 9          Feeding recommendations:  breast feed on demand  Kimberly Chen breast fed her first child for 8 months  Mom states breast feeding is going well  Trenton Rubalcava is latched to the right breast when consult began  Offered education on positioning as Trenton Rubalcava is laying on his back, neck turned towards breast to feed on right breast  Education provided on positioning at the breast; belly to belly; alignment of nipple to nose, drag down to chin to achieve a wide deep latch  Muscle fatigue and breathing breaks discussed  Kimberly Chen changed positioning an Trenton Rubalcava actively suckled at the breast      Kimberly Chen would like a S1 - order sent to case management  Met with mother  Provided mother with Ready, Set, Baby booklet  Discussed Skin to Skin contact an benefits to mom and baby  Talked about the delay of the first bath until baby has adjusted  Spoke about the benefits of rooming in  Feeding on cue and what that means for recognizing infant's hunger  Avoidance of pacifiers for the first month discussed  Talked about exclusive breastfeeding for the first 6 months  Positioning and latch reviewed as well as showing images of other feeding positions  Discussed the properties of a good latch in any position  Reviewed hand/manual expression  Discussed s/s that baby is getting enough milk and some s/s that breastfeeding dyad may need further help  Gave information on common concerns, what to expect the first few weeks after delivery, preparing for other caregivers, and how partners can help  Resources for support also provided  Information on hand expression given  Discussed benefits of knowing how to manually express breast including stimulating milk supply, softening nipple for latch and evacuating breast in the event of engorgement  Worked on positioning infant up at chest level and starting to feed infant with nose arriving at the nipple   Then, using areolar compression to achieve a deep latch that is comfortable and exchanges optimum amounts of milk  Provided education on alignment of nose to breast; bring baby to breast and not breast to baby; support head with opp  Hand in cross cradle; use pillows to lift baby to be belly to belly; ear, shoulder, hip alignment; Support mother's back and place self in comfortable position to support bringing baby to the breast  Shoulders should be down and away from ears      Marleni Win 8/17/2021 2:13 PM

## 2021-08-17 NOTE — PROGRESS NOTES
Progress Note - OB/GYN   Krista Crews 31 y o  female MRN: [de-identified]  Unit/Bed#: -01 Encounter: [id number]    Assessment:  PPD#1 s/p Spontaneous Vaginal Delivery, stable    Plan:    1) Postpartum care  Encourage ambulation   Encourage breastfeeding  Continue current meds   2) Disposition   Anticipate discharge home tomorrow    Subjective/Objective     Subjective:     Patient reports recovering well, only complaint is severe uterine cramps when feeding, somewhat relieved by tylenol/motrin but required her to stop feeding at one point, declines contraception at this time    Pain: no  Tolerating PO: yes  Voiding: yes  Flatus: yes  BM: no  Ambulating: yes  Breastfeeding: Breastfeeding  Chest pain: no  Shortness of breath: no  Leg pain: no  Lochia: WNL    Objective:     Vitals:  Vitals:    08/16/21 1838 08/16/21 2051 08/17/21 0013 08/17/21 0418   BP: 119/63 126/66 131/63 136/77   BP Location:   Right arm Right arm   Pulse: 76 77 82 70   Resp:  18 18 18   Temp:  97 7 °F (36 5 °C) 98 1 °F (36 7 °C) 98 1 °F (36 7 °C)   TempSrc:  Oral Oral Oral   SpO2:   97% 97%   Weight:       Height:           Physical Exam:   GEN: appears well, alert and oriented x 3, pleasant and cooperative   CV: +S1, +S2, no murmurs/rubs/gallops appreciated  RESP: no labored breathing  ABDOMEN: soft, no tenderness, no distention, Uterine fundus firm and non-tender, -2 cm below the umbilicus   EXTREMITIES: non-tender  NEURO Alert and oriented to person, place, and time         Lab Results   Component Value Date    WBC 11 00 (H) 08/16/2021    HGB 13 4 08/16/2021    HCT 38 6 08/16/2021    MCV 96 08/16/2021     08/16/2021         Angelito Stauffer MD, PGY-1  Obstetrics & Gynecology  08/17/21

## 2021-08-18 VITALS
RESPIRATION RATE: 18 BRPM | DIASTOLIC BLOOD PRESSURE: 82 MMHG | HEIGHT: 68 IN | HEART RATE: 66 BPM | SYSTOLIC BLOOD PRESSURE: 119 MMHG | TEMPERATURE: 98 F | OXYGEN SATURATION: 97 % | BODY MASS INDEX: 32.31 KG/M2 | WEIGHT: 213.2 LBS

## 2021-08-18 PROCEDURE — 99024 POSTOP FOLLOW-UP VISIT: CPT | Performed by: OBSTETRICS & GYNECOLOGY

## 2021-08-18 RX ORDER — IBUPROFEN 600 MG/1
600 TABLET ORAL EVERY 6 HOURS PRN
Qty: 30 TABLET | Refills: 0 | Status: SHIPPED | OUTPATIENT
Start: 2021-08-18

## 2021-08-18 RX ORDER — ACETAMINOPHEN 325 MG/1
650 TABLET ORAL EVERY 4 HOURS PRN
Qty: 30 TABLET | Refills: 0 | Status: SHIPPED | OUTPATIENT
Start: 2021-08-18

## 2021-08-18 RX ADMIN — ACETAMINOPHEN 650 MG: 325 TABLET, FILM COATED ORAL at 06:17

## 2021-08-18 RX ADMIN — IBUPROFEN 600 MG: 600 TABLET ORAL at 08:32

## 2021-08-18 NOTE — CASE MANAGEMENT
Breast pump consult: Bruce order entered for [employer] S1 pump for room delivery via [employer]; no additional needs noted

## 2021-08-18 NOTE — PLAN OF CARE
Problem: PAIN - ADULT  Goal: Verbalizes/displays adequate comfort level or baseline comfort level  Description: Interventions:  - Encourage patient to monitor pain and request assistance  - Assess pain using appropriate pain scale  - Administer analgesics based on type and severity of pain and evaluate response  - Implement non-pharmacological measures as appropriate and evaluate response  - Consider cultural and social influences on pain and pain management  - Notify physician/advanced practitioner if interventions unsuccessful or patient reports new pain  Outcome: Completed     Problem: INFECTION - ADULT  Goal: Absence or prevention of progression during hospitalization  Description: INTERVENTIONS:  - Assess and monitor for signs and symptoms of infection  - Monitor lab/diagnostic results  - Monitor all insertion sites, i e  indwelling lines, tubes, and drains  - Monitor endotracheal if appropriate and nasal secretions for changes in amount and color  - [place] appropriate cooling/warming therapies per order  - Administer medications as ordered  - Instruct and encourage patient and family to use good hand hygiene technique  - Identify and instruct in appropriate isolation precautions for identified infection/condition  Outcome: Completed  Goal: Absence of fever/infection during neutropenic period  Description: INTERVENTIONS:  - Monitor WBC    Outcome: Completed     Problem: SAFETY ADULT  Goal: Patient will remain free of falls  Description: INTERVENTIONS:  - Educate patient/family on patient safety including physical limitations  - Instruct patient to call for assistance with activity   - Consult OT/PT to assist with strengthening/mobility   - Keep Call bell within reach  - Keep bed low and locked with side rails adjusted as appropriate  - Keep care items and personal belongings within reach  - Initiate and maintain comfort rounds  - Make Fall Risk Sign visible to staff  - Offer Toileting every 2-3 Hours, in advance of need      - Apply yellow socks and bracelet for high fall risk patients  - Consider moving patient to room near nurses station  Outcome: Completed  Goal: Maintain or return to baseline ADL function  Description: INTERVENTIONS:  -  Assess patient's ability to carry out ADLs; assess patient's baseline for ADL function and identify physical deficits which impact ability to perform ADLs (bathing, care of mouth/teeth, toileting, grooming, dressing, etc )  - Assess/evaluate cause of self-care deficits   - Assess range of motion  - Assess patient's mobility; develop plan if impaired  - Assess patient's need for assistive devices and provide as appropriate  - Encourage maximum independence but intervene and supervise when necessary  - Involve family in performance of ADLs  - Assess for home care needs following discharge   - Consider OT consult to assist with ADL evaluation and planning for discharge  - Provide patient education as appropriate  Outcome: Completed  Goal: Maintains/Returns to pre admission functional level  Description: INTERVENTIONS:  - Perform BMAT or MOVE assessment daily    - Set and communicate daily mobility goal to care team and patient/family/caregiver     - Collaborate with rehabilitation services on mobility goals if consulted  - Out of bed for toileting  - Record patient progress and toleration of activity level   Outcome: Completed     Problem: DISCHARGE PLANNING  Goal: Discharge to home or other facility with appropriate resources  Description: INTERVENTIONS:  - Identify barriers to discharge w/patient and caregiver  - Arrange for needed discharge resources and transportation as appropriate  - Identify discharge learning needs (meds, wound care, etc )  - Arrange for interpretive services to assist at discharge as needed  - Refer to Case Management Department for coordinating discharge planning if the patient needs post-hospital services based on physician/advanced practitioner order or complex needs related to functional status, cognitive ability, or social support system  Outcome: Completed     Problem: POSTPARTUM  Goal: Experiences normal postpartum course  Description: INTERVENTIONS:  - Monitor maternal vital signs  - Assess uterine involution and lochia  Outcome: Completed  Goal: Appropriate maternal -  bonding  Description: INTERVENTIONS:  - Identify family support  - Assess for appropriate maternal/infant bonding   -Encourage maternal/infant bonding opportunities  - Referral to  or  as needed  Outcome: Completed  Goal: Establishment of infant feeding pattern  Description: INTERVENTIONS:  - Assess breast/bottle feeding  - Refer to lactation as needed  Outcome: Completed  Goal: Incision(s), wounds(s) or drain site(s) healing without S/S of infection  Description: INTERVENTIONS  - Assess and document dressing, incision, wound bed, drain sites and surrounding tissue  - Provide patient and family education    Outcome: Completed

## 2021-08-18 NOTE — PROGRESS NOTES
Progress Note - OB/GYN   Bessie Brown 31 y o  female MRN: [de-identified]  Unit/Bed#: -01 Encounter: [id number]    Assessment:  Post partum Day #2 s/p , stable, baby in nursery  Blood pressures: 125//76    Plan:  #1  S/p   - Pain well-controlled on oral analgesics  - Patient would like to go home today    #2  Contraception  - Patient undecided, considering IUD  - Readdress at postpartum visit    #3  Continue routine post partum care  - Encourage ambulation  - Encourage breastfeeding  - Anticipate discharge PPD2/3       Subjective:   Post delivery  Patient is doing well  Lochia WNL  Pain well controlled  Pain: yes, cramping, improved with meds  Tolerating PO: yes  Voiding: yes  Flatus: yes  BM: yes  Ambulating: yes  Breastfeeding:  yes  Chest pain: no  Shortness of breath: no  Leg pain: no  Lochia: WNL      Objective:     Vitals:   Vitals:    21 0418 21 0905 21 1552 21 2330   BP: 136/77 125/71 133/77 134/76   BP Location: Right arm   Right arm   Pulse: 70 86 64 83   Resp: 18 20 18 18   Temp: 98 1 °F (36 7 °C) 98 1 °F (36 7 °C) 98 2 °F (36 8 °C) 98 9 °F (37 2 °C)   TempSrc: Oral Oral Oral Oral   SpO2: 97%      Weight:       Height:           No intake or output data in the 24 hours ending 21 0525    Lab Results   Component Value Date    WBC 11 00 (H) 2021    HGB 13 4 2021    HCT 38 6 2021    MCV 96 2021     2021       Physical Exam:     Gen: NAD  CV: RRR  Lungs: CTA b/l  Abd: Soft, non-tender, non-distended, no rebound or guarding  Uterine fundus firm and non-tender, 2 cm below the umbilicus     Ext: Non tender      Cat Rojas MD  OB/GYN PGY-1  2021  5:25 AM

## 2021-08-18 NOTE — PLAN OF CARE
Problem: PAIN - ADULT  Goal: Verbalizes/displays adequate comfort level or baseline comfort level  Description: Interventions:  - Encourage patient to monitor pain and request assistance  - Assess pain using appropriate pain scale  - Administer analgesics based on type and severity of pain and evaluate response  - Implement non-pharmacological measures as appropriate and evaluate response  - Consider cultural and social influences on pain and pain management  - Notify physician/advanced practitioner if interventions unsuccessful or patient reports new pain  8/17/2021 2056 by Jacklyn Lopez RN  Outcome: Progressing  8/17/2021 2056 by Jacklyn Lopez RN  Outcome: Progressing     Problem: INFECTION - ADULT  Goal: Absence or prevention of progression during hospitalization  Description: INTERVENTIONS:  - Assess and monitor for signs and symptoms of infection  - Monitor lab/diagnostic results  - Monitor all insertion sites, i e  indwelling lines, tubes, and drains  - Monitor endotracheal if appropriate and nasal secretions for changes in amount and color  - [place] appropriate cooling/warming therapies per order  - Administer medications as ordered  - Instruct and encourage patient and family to use good hand hygiene technique  - Identify and instruct in appropriate isolation precautions for identified infection/condition  8/17/2021 2056 by Jacklyn Lopez RN  Outcome: Progressing  8/17/2021 2056 by Jacklyn Lopez RN  Outcome: Progressing  Goal: Absence of fever/infection during neutropenic period  Description: INTERVENTIONS:  - Monitor WBC    8/17/2021 2056 by Jacklyn Lopez RN  Outcome: Progressing  8/17/2021 2056 by Jacklyn Lopez RN  Outcome: Progressing     Problem: SAFETY ADULT  Goal: Patient will remain free of falls  Description: INTERVENTIONS:  - Educate patient/family on patient safety including physical limitations  - Instruct patient to call for assistance with activity   - Consult OT/PT to assist with strengthening/mobility   - Keep Call bell within reach  - Keep bed low and locked with side rails adjusted as appropriate  - Keep care items and personal belongings within reach  - Initiate and maintain comfort rounds  - Make Fall Risk Sign visible to staff  - Offer Toileting every  Hours, in advance of need  - Initiate/Maintain alarm  - Obtain necessary fall risk management equipment:   - Apply yellow socks and bracelet for high fall risk patients  - Consider moving patient to room near nurses station  8/17/2021 2056 by Jacklyn Lopez RN  Outcome: Progressing  8/17/2021 2056 by Jacklyn Lopez RN  Outcome: Progressing  Goal: Maintain or return to baseline ADL function  Description: INTERVENTIONS:  -  Assess patient's ability to carry out ADLs; assess patient's baseline for ADL function and identify physical deficits which impact ability to perform ADLs (bathing, care of mouth/teeth, toileting, grooming, dressing, etc )  - Assess/evaluate cause of self-care deficits   - Assess range of motion  - Assess patient's mobility; develop plan if impaired  - Assess patient's need for assistive devices and provide as appropriate  - Encourage maximum independence but intervene and supervise when necessary  - Involve family in performance of ADLs  - Assess for home care needs following discharge   - Consider OT consult to assist with ADL evaluation and planning for discharge  - Provide patient education as appropriate  8/17/2021 2056 by Jacklyn Lopez RN  Outcome: Progressing  8/17/2021 2056 by Jacklyn Lopez RN  Outcome: Progressing  Goal: Maintains/Returns to pre admission functional level  Description: INTERVENTIONS:  - Perform BMAT or MOVE assessment daily    - Set and communicate daily mobility goal to care team and patient/family/caregiver  - Collaborate with rehabilitation services on mobility goals if consulted  - Perform Range of Motion  times a day  - Reposition patient every  hours    - Dangle patient times a day  - Stand patient  times a day  - Ambulate patient  times a day  - Out of bed to chair  times a day   - Out of bed for meals  times a day  - Out of bed for toileting  - Record patient progress and toleration of activity level   2021 by Leeanne Walls RN  Outcome: Progressing  2021 by Leeanne Walls RN  Outcome: Progressing     Problem: DISCHARGE PLANNING  Goal: Discharge to home or other facility with appropriate resources  Description: INTERVENTIONS:  - Identify barriers to discharge w/patient and caregiver  - Arrange for needed discharge resources and transportation as appropriate  - Identify discharge learning needs (meds, wound care, etc )  - Arrange for interpretive services to assist at discharge as needed  - Refer to Case Management Department for coordinating discharge planning if the patient needs post-hospital services based on physician/advanced practitioner order or complex needs related to functional status, cognitive ability, or social support system  2021 by Leeanne Walls RN  Outcome: Progressing  2021 by Leeanne Walls RN  Outcome: Progressing     Problem: POSTPARTUM  Goal: Experiences normal postpartum course  Description: INTERVENTIONS:  - Monitor maternal vital signs  - Assess uterine involution and lochia  2021 by Leeanne Walls RN  Outcome: Progressing  2021 by Leeanne Walls RN  Outcome: Progressing  Goal: Appropriate maternal -  bonding  Description: INTERVENTIONS:  - Identify family support  - Assess for appropriate maternal/infant bonding   -Encourage maternal/infant bonding opportunities  - Referral to  or  as needed  2021 by Leeanne Walls RN  Outcome: Progressing  2021 by Leeanne Walls RN  Outcome: Progressing  Goal: Establishment of infant feeding pattern  Description: INTERVENTIONS:  - Assess breast/bottle feeding  - Refer to lactation as needed  2021 2056 by Wander Moore RN  Outcome: Progressing  8/17/2021 2056 by Wander Moore RN  Outcome: Progressing  Goal: Incision(s), wounds(s) or drain site(s) healing without S/S of infection  Description: INTERVENTIONS  - Assess and document dressing, incision, wound bed, drain sites and surrounding tissue  - Provide patient and family education  - Perform skin care/dressing changes every   8/17/2021 2056 by Wander Moore RN  Outcome: Progressing  8/17/2021 2056 by Wander Moore RN  Outcome: Progressing

## 2021-08-18 NOTE — CASE MANAGEMENT
Made aware patient for discharge this am  As requested pump not on site, requested [facility] to ship pump to patient at home

## 2021-08-19 LAB
DME PARACHUTE DELIVERY DATE ACTUAL: NORMAL
DME PARACHUTE DELIVERY DATE EXPECTED: NORMAL
DME PARACHUTE DELIVERY DATE REQUESTED: NORMAL
DME PARACHUTE ITEM DESCRIPTION: NORMAL
DME PARACHUTE ORDER STATUS: NORMAL
DME PARACHUTE SUPPLIER NAME: NORMAL
DME PARACHUTE SUPPLIER PHONE: NORMAL

## 2021-08-20 ENCOUNTER — TELEPHONE (OUTPATIENT)
Dept: OBGYN CLINIC | Facility: CLINIC | Age: 26
End: 2021-08-20

## 2021-08-20 NOTE — TELEPHONE ENCOUNTER
Pt is 4 days post SAVD - had some increase in bleeding overnight, more br red in color, passed sev clots  Breastfeeding  Reassured - pt to recall office if changing pad < 2 hrs or prn

## 2021-09-08 ENCOUNTER — POSTPARTUM VISIT (OUTPATIENT)
Dept: OBGYN CLINIC | Facility: CLINIC | Age: 26
End: 2021-09-08

## 2021-09-08 VITALS — HEIGHT: 68 IN | BODY MASS INDEX: 28.73 KG/M2 | WEIGHT: 189.6 LBS

## 2021-09-08 PROCEDURE — 99024 POSTOP FOLLOW-UP VISIT: CPT | Performed by: OBSTETRICS & GYNECOLOGY

## 2021-09-08 NOTE — PROGRESS NOTES
3 WK POSTPARTUM APPT:     SAVD 2021 (39 4/7 wk)  "Yo Moore"  9 lb 7 oz  (tight nuchal cord)    Breastfeeding q 2 hrs, 4 hrs @ night - good milk supply, using nipple shield - sometimes latch issue @ night  Plans to breastfeed at least 6-7 months  Lochia - no discharge x 5 days  Taking prenatal vits & DHA supp  Had 1st deg perineal lac w/rep  [facility], no [facility]  Had TDAP during preg  Pt had Rhogam during preg & after delivery  Birth control - discussed progesterone ocps, Depo Provera , Mirena IUD - prob ocps  Completed [place] Dep scale - score = 6 20 month old doing well with baby  Will be staying home until 12 wk postpartum - 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

## 2021-09-29 ENCOUNTER — POSTPARTUM VISIT (OUTPATIENT)
Dept: OBGYN CLINIC | Facility: CLINIC | Age: 26
End: 2021-09-29

## 2021-09-29 VITALS
BODY MASS INDEX: 28.4 KG/M2 | WEIGHT: 187.4 LBS | DIASTOLIC BLOOD PRESSURE: 86 MMHG | HEIGHT: 68 IN | SYSTOLIC BLOOD PRESSURE: 122 MMHG

## 2021-09-29 PROCEDURE — 99024 POSTOP FOLLOW-UP VISIT: CPT | Performed by: OBSTETRICS & GYNECOLOGY

## 2021-09-29 NOTE — PATIENT INSTRUCTIONS
The patient was informed of a stable postpartum physical examination of breasts in perineum all back to normal   Nursing is going well  Does have a history of slight anxiety  She is not sure if she has any the present time  She was gushing with her  on the next 2-3 weeks  She may need E-low-dose Zoloft for Prozac she will keep me informed  She will use condoms for contraception for now  Return my office in 6 months if everything resolved  Not will see her as soon as possible

## 2021-09-29 NOTE — PROGRESS NOTES
This is a 48-year-old white female, she is approximately 6 weeks status post vaginal delivery  She is now  2 para 2  Her ovaries child is now was in 24  She is nursing which was going well  Denies any problem with postpartum depression or baby blues  One further questions there was slight anxiety  We had a discussion about that further detail  She may need to be a SSRI  She will discuss it with her  and the video next to 3 weeks how she is feeling  She will use condoms for contraception  Examination of the breasts the abdomen pelvic exam all within normal limits uterus is small mobile nontender the perineum was well-healed  Impression doing well status post vaginal delivery infant doing well in thriving  Nursing is going well  She will make arrangements for COVID vaccine now  She will keep the eye on her anxiety if things worsen either she or her  will contact being will put her low-dose course of Prozac 0 Zoloft    Otherwise return my office in 6 months for yearly exam

## 2022-02-04 ENCOUNTER — TELEPHONE (OUTPATIENT)
Dept: OBGYN CLINIC | Facility: CLINIC | Age: 27
End: 2022-02-04

## 2022-02-04 DIAGNOSIS — F41.8 POSTPARTUM ANXIETY: Primary | ICD-10-CM

## 2022-02-04 NOTE — TELEPHONE ENCOUNTER
Pt 6 months postpartum - state she is feeling exactly the same way she felt at this time postpartum with 1st child - feeling anxious, "nervous something bad going to happen to baby"  States she does not feel harm toherself, baby or others  Prev took Zoloft 50 mg for sev months  She has relocated to [place] 1 month ago & not established with MD yet  Would you prescribe Zoloft again & f/u telemedicine appt?

## 2022-02-04 NOTE — TELEPHONE ENCOUNTER
Pt informed can start Zoloft 50 mg daily & f/u in 1 month - pt informed to recall office to update as unable to do telemedicine appt with [place] residing in another state    Please sign off on presc to [facility])

## 2022-10-10 ENCOUNTER — APPOINTMENT (RX ONLY)
Dept: URBAN - METROPOLITAN AREA CLINIC 91 | Facility: CLINIC | Age: 27
Setting detail: DERMATOLOGY
End: 2022-10-10

## 2022-10-10 DIAGNOSIS — L21.8 OTHER SEBORRHEIC DERMATITIS: ICD-10-CM

## 2022-10-10 PROCEDURE — 99204 OFFICE O/P NEW MOD 45 MIN: CPT

## 2022-10-10 PROCEDURE — ? DIAGNOSIS COMMENT

## 2022-10-10 PROCEDURE — ? PRESCRIPTION

## 2022-10-10 PROCEDURE — ? FULL BODY SKIN EXAM - DECLINED

## 2022-10-10 PROCEDURE — ? TREATMENT REGIMEN

## 2022-10-10 PROCEDURE — ? COUNSELING

## 2022-10-10 PROCEDURE — ? PRESCRIPTION MEDICATION MANAGEMENT

## 2022-10-10 RX ORDER — FLUCONAZOLE 100 MG/1
1 TABLET ORAL QWEEK
Qty: 6 | Refills: 0 | Status: ERX | COMMUNITY
Start: 2022-10-10

## 2022-10-10 RX ORDER — CLOBETASOL PROPIONATE 0.5 MG/ML
1 SOLUTION TOPICAL QHS
Qty: 50 | Refills: 2 | Status: ERX | COMMUNITY
Start: 2022-10-10

## 2022-10-10 RX ORDER — CICLOPIROX 10 MG/.96ML
1 SHAMPOO TOPICAL BIW
Qty: 120 | Refills: 6 | Status: ERX | COMMUNITY
Start: 2022-10-10

## 2022-10-10 RX ADMIN — CICLOPIROX 1: 10 SHAMPOO TOPICAL at 00:00

## 2022-10-10 RX ADMIN — FLUCONAZOLE 3: 100 TABLET ORAL at 00:00

## 2022-10-10 RX ADMIN — CLOBETASOL PROPIONATE 1: 0.5 SOLUTION TOPICAL at 00:00

## 2022-10-10 ASSESSMENT — SEVERITY ASSESSMENT: HOW SEVERE IS THIS PATIENT'S CONDITION?: MILD

## 2022-10-10 NOTE — PROCEDURE: PRESCRIPTION MEDICATION MANAGEMENT
Render In Strict Bullet Format?: No
Initiate Treatment: Ketoconazole Shampoo TIW\\n\\nClobetasol Scalp Solution QHS\\n\\nfluconazole 100 mg tablet Qweek
Detail Level: Zone

## 2022-10-10 NOTE — HPI: RASH (SEBORRHEIC DERMATITIS)
How Severe Is It?: moderate
Is This A New Presentation, Or A Follow-Up?: Rash
Additional History: Just stopped breastfeeding